# Patient Record
Sex: FEMALE | Race: ASIAN | Employment: FULL TIME | URBAN - METROPOLITAN AREA
[De-identification: names, ages, dates, MRNs, and addresses within clinical notes are randomized per-mention and may not be internally consistent; named-entity substitution may affect disease eponyms.]

---

## 2017-06-01 ENCOUNTER — OFFICE VISIT (OUTPATIENT)
Dept: INTERNAL MEDICINE CLINIC | Age: 37
End: 2017-06-01

## 2017-06-01 VITALS
DIASTOLIC BLOOD PRESSURE: 79 MMHG | HEART RATE: 67 BPM | TEMPERATURE: 98.1 F | WEIGHT: 213 LBS | HEIGHT: 66 IN | OXYGEN SATURATION: 98 % | SYSTOLIC BLOOD PRESSURE: 118 MMHG | RESPIRATION RATE: 10 BRPM | BODY MASS INDEX: 34.23 KG/M2

## 2017-06-01 DIAGNOSIS — E66.9 OBESITY, UNSPECIFIED OBESITY SEVERITY, UNSPECIFIED OBESITY TYPE: ICD-10-CM

## 2017-06-01 DIAGNOSIS — K64.9 HEMORRHOIDS, UNSPECIFIED HEMORRHOID TYPE: ICD-10-CM

## 2017-06-01 DIAGNOSIS — R53.83 FATIGUE, UNSPECIFIED TYPE: ICD-10-CM

## 2017-06-01 DIAGNOSIS — R63.5 WEIGHT GAIN: ICD-10-CM

## 2017-06-01 DIAGNOSIS — Z76.89 ENCOUNTER TO ESTABLISH CARE WITH NEW DOCTOR: Primary | ICD-10-CM

## 2017-06-01 RX ORDER — BISMUTH SUBSALICYLATE 262 MG
1 TABLET,CHEWABLE ORAL DAILY
COMMUNITY

## 2017-06-01 NOTE — PROGRESS NOTES
Bobby Rose is a 30 y.o. female who presents today for Establish Care (c/o fatigue x 2 months, weight loss, hemorroids)  . She has a history of There is no problem list on file for this patient. .    Today patient is here to establish care. Previous PCP Dr. Gwyn Schmid. she is switching because recently moved here, but rarely seen. Records are not available for me to review. I see her  in my clinic.     she does have other concerns. Hemorrhoids: For some time. This has been getting a bit worse recently. Worse with constipation. These are acting up a couple times per week. Increased hydration has helped. Weight gain/fatigue: notes that recently she has been having increased fatigue. Seems to be mid-day around 2-3. Diet stable. Sleeping 6-7 hours but this has been stable. Notes that weight has trended up. Pt working out regularly. Does both cardiac and weight resistance multiple times per week. Pre-pregnancy weight around 175#. Since pregnancy highest 230# and has been as low as 195#. Pt does have two children who are 5/9. Many vegetables for diet. Social:  at [employer]. Vegetarian but not vegan. GYN: Dr. Lainey Gómez; PAP UTD. No birth control. Not planning more children. Family:   Mother: pre-DM  Father: Hypothyroidism  MGP: diabetes    ROS  Review of Systems   Constitutional: Positive for malaise/fatigue. Negative for chills, fever and weight loss. Weight gain   HENT: Negative for ear discharge, ear pain, hearing loss and tinnitus. Eyes: Negative for blurred vision, double vision, photophobia and pain. Respiratory: Negative for cough, hemoptysis, sputum production and shortness of breath. Cardiovascular: Negative for chest pain, palpitations, orthopnea and claudication. Gastrointestinal: Positive for blood in stool (With hemorrhoids. ). Negative for abdominal pain, heartburn, nausea and vomiting.    Genitourinary: Negative for dysuria, frequency and urgency. Musculoskeletal: Negative for back pain, joint pain, myalgias and neck pain. Skin: Negative for itching and rash. Neurological: Negative. Negative for headaches. Psychiatric/Behavioral: Negative for depression, hallucinations, substance abuse and suicidal ideas. The patient is not nervous/anxious. Visit Vitals    /79 (BP 1 Location: Left arm, BP Patient Position: Sitting)    Pulse 67    Temp 98.1 °F (36.7 °C) (Oral)    Resp 10    Ht 5' 6\" (1.676 m)    Wt 213 lb (96.6 kg)    SpO2 98%    Breastfeeding No    BMI 34.38 kg/m2       Physical Exam   Constitutional: She is oriented to person, place, and time and well-developed, well-nourished, and in no distress. No distress. HENT:   Head: Normocephalic and atraumatic. Mouth/Throat: No oropharyngeal exudate. Eyes: Conjunctivae are normal. Pupils are equal, round, and reactive to light. No scleral icterus. Neck: Normal range of motion. No JVD present. No thyromegaly present. Cardiovascular: Normal rate and regular rhythm. Exam reveals no gallop and no friction rub. No murmur heard. Pulmonary/Chest: Effort normal and breath sounds normal. No respiratory distress. She has no wheezes. Abdominal: Soft. Bowel sounds are normal. She exhibits no distension. There is no rebound and no guarding. Musculoskeletal: Normal range of motion. She exhibits no edema. Neurological: She is alert and oriented to person, place, and time. No cranial nerve deficit. Skin: Skin is dry. No rash noted. Psychiatric: Mood, memory, affect and judgment normal.       Current Outpatient Prescriptions   Medication Sig    multivitamin (ONE A DAY) tablet Take 1 Tab by mouth daily.  hydrocortisone-pramoxine (PROCTOFOAM HC) rectal foam Insert 1 Applicator into rectum two (2) times a day. No current facility-administered medications for this visit. History reviewed. No pertinent past medical history.    History reviewed. No pertinent surgical history. Social History   Substance Use Topics    Smoking status: Never Smoker    Smokeless tobacco: Not on file    Alcohol use Yes      Comment: 4-5 glasses per year      Family History   Problem Relation Age of Onset    Hypertension Mother     Lung Disease Mother      TB in '01    Elevated Lipids Father     Stroke Maternal Grandmother     Arthritis-osteo Maternal Grandfather     Cancer Maternal Grandfather 80     colon ca    Diabetes Maternal Grandfather     Elevated Lipids Maternal Grandfather         No Known Allergies     Assessment/Plan  Pete Mendes was seen today for establish care. Diagnoses and all orders for this visit:    Encounter to establish care with new doctor - No significant old records. GYN care done elsewhere. Hemorrhoids, unspecified hemorrhoid type - Discussed prevention of constipation and wiping with wet wipes. Pt to use PRN proctofoam.   -     hydrocortisone-pramoxine (PROCTOFOAM HC) rectal foam; Insert 1 Applicator into rectum two (2) times a day. Weight gain - discussed goals of weight loss. Also discussed possibilities available pharmacologically, especially phentermine and Saxenda. 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 Pt doing a great job working out and dieting. Check thyroid, but likely genetically linked as mother is overweight.   -     CBC WITH AUTOMATED DIFF  -     METABOLIC PANEL, COMPREHENSIVE  -     LIPID PANEL    Fatigue, unspecified type  -     TSH 3RD GENERATION    Obesity, unspecified obesity severity, unspecified obesity type  -     CBC WITH AUTOMATED DIFF  -     METABOLIC PANEL, COMPREHENSIVE  -     LIPID PANEL    Over 50% of a visit of 45 minutes spent reviewing diagnosis and treatment options and side effects of medicines. Follow-up Disposition:  Return in about 4 weeks (around 6/29/2017).     Norm Carter MD  6/1/2017

## 2017-06-01 NOTE — MR AVS SNAPSHOT
Visit Information Date & Time Provider Department Dept. Phone Encounter #  
 6/1/2017 10:15 AM Charmaine Vasquez MD [facility] [address] [id number] Follow-up Instructions Return in about 4 weeks (around 6/29/2017). Upcoming Health Maintenance Date Due DTaP/Tdap/Td series (1 - Tdap) 1/16/2003 PAP AKA CERVICAL CYTOLOGY 1/16/2003 INFLUENZA AGE 9 TO ADULT 8/1/2017 Allergies as of 6/1/2017  Review Complete On: 6/1/2017 By: Charmaine Vasquez MD  
 No Known Allergies Current Immunizations  Never Reviewed No immunizations on file. Not reviewed this visit You Were Diagnosed With   
  
 Codes Comments Encounter to establish care with new doctor    -  Primary ICD-10-CM: Z71.89 ICD-9-CM: V65.8 Hemorrhoids, unspecified hemorrhoid type     ICD-10-CM: K64.9 ICD-9-CM: 455.6 Weight gain     ICD-10-CM: R63.5 ICD-9-CM: 783.1 Fatigue, unspecified type     ICD-10-CM: R53.83 ICD-9-CM: 780.79 Obesity, unspecified obesity severity, unspecified obesity type     ICD-10-CM: E66.9 ICD-9-CM: 278.00 Vitals BP Pulse Temp Resp Height(growth percentile) Weight(growth percentile) 118/79 (BP 1 Location: Left arm, BP Patient Position: Sitting) 67 98.1 °F (36.7 °C) (Oral) 10 5' 6\" (1.676 m) 213 lb (96.6 kg) LMP SpO2 Breastfeeding? BMI OB Status Smoking Status 05/22/2017 (Exact Date) 98% No 34.38 kg/m2 Having regular periods Never Smoker Vitals History BMI and BSA Data Body Mass Index Body Surface Area  
 34.38 kg/m 2 2.12 m 2 Preferred Pharmacy Pharmacy Name [facility], [address] [phone] Your Updated Medication List  
  
   
This list is accurate as of: 6/1/17 10:57 AM.  Always use your most recent med list.  
  
  
  
  
 hydrocortisone-pramoxine rectal foam  
Commonly known as:  PROCTOFOAM HC  
 Insert 1 Applicator into rectum two (2) times a day. multivitamin tablet Commonly known as:  ONE A DAY Take 1 Tab by mouth daily. Prescriptions Sent to Pharmacy Refills  
 hydrocortisone-pramoxine (PROCTOFOAM HC) rectal foam 0 Sig: Insert 1 Applicator into rectum two (2) times a day. Class: Normal  
 Pharmacy: [facility] [address] #: [phone] Route: Rectal  
  
We Performed the Following CBC WITH AUTOMATED DIFF [61850 CPT(R)] LIPID PANEL [88496 CPT(R)] METABOLIC PANEL, COMPREHENSIVE [37727 CPT(R)] TSH 3RD GENERATION [32792 CPT(R)] Follow-up Instructions Return in about 4 weeks (around 6/29/2017). Introducing [facility]! [facility] introduces [employer] patient portal. Now you can access parts of your medical record, email your doctor's office, and request medication refills online. 1. In your internet browser, go to [email or web address] 2. Click on the First Time User? Click Here link in the Sign In box. You will see the New Member Sign Up page. 3. Enter your [employer] Access Code exactly as it appears below. You will not need to use this code after youve completed the sign-up process. If you do not sign up before the expiration date, you must request a new code. · [employer] Access Code: [email or web address] Expires: 8/30/2017  9:36 AM 
 
4. Enter the last four digits of your Social Security Number (xxxx) and Date of Birth (mm/dd/yyyy) as indicated and click Submit. You will be taken to the next sign-up page. 5. Create a X[employer]kang ID. This will be your [employer] login ID and cannot be changed, so think of one that is secure and easy to remember. 6. Create a IPGt password. You can change your password at any time. 7. Enter your Password Reset Question and Answer. This can be used at a later time if you forget your password. 8. Enter your e-mail address. You will receive e-mail notification when new information is available in [address] 9. Click Sign Up. You can now view and download portions of your medical record. 10. Click the Download Summary menu link to download a portable copy of your medical information. If you have questions, please visit the Frequently Asked Questions section of the [employer] website. Remember, [employer] is NOT to be used for urgent needs. For medical emergencies, dial 911. Now available from your iPhone and Android! Please provide this summary of care documentation to your next provider. Your primary care clinician is listed as Elio Trent. If you have any questions after today's visit, please call [phone].

## 2017-06-07 DIAGNOSIS — E03.9 ACQUIRED HYPOTHYROIDISM: Primary | ICD-10-CM

## 2017-06-07 LAB
ALBUMIN SERPL-MCNC: 3.8 G/DL (ref 3.5–5.5)
ALBUMIN/GLOB SERPL: 1.4 {RATIO} (ref 1.2–2.2)
ALP SERPL-CCNC: 66 IU/L (ref 39–117)
ALT SERPL-CCNC: 19 IU/L (ref 0–32)
AST SERPL-CCNC: 19 IU/L (ref 0–40)
BASOPHILS # BLD AUTO: 0 X10E3/UL (ref 0–0.2)
BASOPHILS NFR BLD AUTO: 1 %
BILIRUB SERPL-MCNC: 0.5 MG/DL (ref 0–1.2)
BUN SERPL-MCNC: 11 MG/DL (ref 6–20)
BUN/CREAT SERPL: 14 (ref 9–23)
CALCIUM SERPL-MCNC: 9 MG/DL (ref 8.7–10.2)
CHLORIDE SERPL-SCNC: 100 MMOL/L (ref 96–106)
CHOLEST SERPL-MCNC: 187 MG/DL (ref 100–199)
CO2 SERPL-SCNC: 25 MMOL/L (ref 18–29)
CREAT SERPL-MCNC: 0.78 MG/DL (ref 0.57–1)
EOSINOPHIL # BLD AUTO: 0.1 X10E3/UL (ref 0–0.4)
EOSINOPHIL NFR BLD AUTO: 2 %
ERYTHROCYTE [DISTWIDTH] IN BLOOD BY AUTOMATED COUNT: 13.4 % (ref 12.3–15.4)
GLOBULIN SER CALC-MCNC: 2.8 G/DL (ref 1.5–4.5)
GLUCOSE SERPL-MCNC: 85 MG/DL (ref 65–99)
HCT VFR BLD AUTO: 38.8 % (ref 34–46.6)
HDLC SERPL-MCNC: 47 MG/DL
HGB BLD-MCNC: 12.8 G/DL (ref 11.1–15.9)
IMM GRANULOCYTES # BLD: 0 X10E3/UL (ref 0–0.1)
IMM GRANULOCYTES NFR BLD: 0 %
INTERPRETATION, 910389: NORMAL
LDLC SERPL CALC-MCNC: 120 MG/DL (ref 0–99)
LYMPHOCYTES # BLD AUTO: 2 X10E3/UL (ref 0.7–3.1)
LYMPHOCYTES NFR BLD AUTO: 39 %
MCH RBC QN AUTO: 27.2 PG (ref 26.6–33)
MCHC RBC AUTO-ENTMCNC: 33 G/DL (ref 31.5–35.7)
MCV RBC AUTO: 83 FL (ref 79–97)
MONOCYTES # BLD AUTO: 0.3 X10E3/UL (ref 0.1–0.9)
MONOCYTES NFR BLD AUTO: 6 %
NEUTROPHILS # BLD AUTO: 2.7 X10E3/UL (ref 1.4–7)
NEUTROPHILS NFR BLD AUTO: 52 %
PLATELET # BLD AUTO: 328 X10E3/UL (ref 150–379)
POTASSIUM SERPL-SCNC: 4.4 MMOL/L (ref 3.5–5.2)
PROT SERPL-MCNC: 6.6 G/DL (ref 6–8.5)
RBC # BLD AUTO: 4.7 X10E6/UL (ref 3.77–5.28)
SODIUM SERPL-SCNC: 140 MMOL/L (ref 134–144)
TRIGL SERPL-MCNC: 98 MG/DL (ref 0–149)
TSH SERPL DL<=0.005 MIU/L-ACNC: 5.96 UIU/ML (ref 0.45–4.5)
VLDLC SERPL CALC-MCNC: 20 MG/DL (ref 5–40)
WBC # BLD AUTO: 5.1 X10E3/UL (ref 3.4–10.8)

## 2017-06-07 RX ORDER — LEVOTHYROXINE SODIUM 50 UG/1
50 TABLET ORAL
Qty: 30 TAB | Refills: 4 | Status: SHIPPED | OUTPATIENT
Start: 2017-06-07 | End: 2017-11-09 | Stop reason: SDUPTHER

## 2017-06-08 ENCOUNTER — TELEPHONE (OUTPATIENT)
Dept: INTERNAL MEDICINE CLINIC | Age: 37
End: 2017-06-08

## 2017-06-08 NOTE — TELEPHONE ENCOUNTER
----- Message from Sisi William sent at 6/8/2017  3:52 PM EDT -----  Regarding: Dr. David Diaz Telephone  Patient would like a call back regarding her medications.  Contact is [phone]

## 2017-09-28 ENCOUNTER — OFFICE VISIT (OUTPATIENT)
Dept: INTERNAL MEDICINE CLINIC | Age: 37
End: 2017-09-28

## 2017-09-28 VITALS
HEIGHT: 66 IN | TEMPERATURE: 98.2 F | OXYGEN SATURATION: 98 % | BODY MASS INDEX: 34.52 KG/M2 | HEART RATE: 64 BPM | SYSTOLIC BLOOD PRESSURE: 122 MMHG | RESPIRATION RATE: 16 BRPM | DIASTOLIC BLOOD PRESSURE: 77 MMHG | WEIGHT: 214.8 LBS

## 2017-09-28 DIAGNOSIS — E66.9 OBESITY, UNSPECIFIED OBESITY SEVERITY, UNSPECIFIED OBESITY TYPE: ICD-10-CM

## 2017-09-28 DIAGNOSIS — E03.9 ACQUIRED HYPOTHYROIDISM: ICD-10-CM

## 2017-09-28 DIAGNOSIS — Z00.00 WELLNESS EXAMINATION: Primary | ICD-10-CM

## 2017-09-28 RX ORDER — PHENTERMINE HYDROCHLORIDE 37.5 MG/1
37.5 TABLET ORAL
Qty: 30 TAB | Refills: 1 | Status: SHIPPED | OUTPATIENT
Start: 2017-09-28 | End: 2018-01-08 | Stop reason: SDUPTHER

## 2017-09-28 NOTE — PATIENT INSTRUCTIONS
Liraglutide (By injection)   Liraglutide (rei-q-DELP-tide)  Treats type 2 diabetes. Also used to help with weight loss in certain patients. Brand Name(s): Saxenda, Victoza   There may be other brand names for this medicine. When This Medicine Should Not Be Used: This medicine is not right for everyone. Do not use it if you had an allergic reaction to liraglutide, you have a multiple endocrine neoplasia syndrome type 2 (MEN 2), or if you or anyone in your family had medullary thyroid cancer. Tell your doctor if you are pregnant or become pregnant while you are using this medicine. How to Use This Medicine:   Injectable  · Your doctor will prescribe your exact dose and tell you how often it should be given. This medicine is given as a shot under the skin of your stomach, thighs, or upper arms. · If you use insulin in addition to this medicine, do not mix them into the same syringe. You may give the shots in the same area (such as your stomach), but do not give the shots right next to each other. · You may be taught how to give your medicine at home. Make sure you understand all instructions before giving yourself an injection. Do not use more medicine or use it more often than your doctor tells you to. · You will be shown the body areas where this shot can be given. Use a different body area each time you give yourself a shot. Keep track of where you give each shot to make sure you rotate body areas. · Use a new needle and syringe each time you inject your medicine. · Never share medicine pens with others under any circumstances. Sharing needles or pens can result in transmission of infection. · Drink extra fluids so you will urinate more often and help prevent kidney problems. · This medicine should come with a Medication Guide. Ask your pharmacist for a copy if you do not have one. · Missed dose: If you miss a dose of this medicine, use it as soon as you remember.  Then take your next daily dose as usual on the following day. Never take extra medicine to make up for a missed dose. If you miss a dose for 3 days or more, call your doctor to talk about how to restart your treatment. · Store your new, unused medicine pen in the refrigerator, in the original carton, and protect it from light. Do not freeze this medicine, and do not use the medicine if it has been frozen. You may store the opened medicine pen in the refrigerator or at room temperature for 30 days. Throw away your used pen after 30 days, even if it still has medicine in it. Remove the needle from the pen before you store it. · Throw away used needles in a hard, closed container that the needles cannot poke through. Keep this container away from children and pets. Drugs and Foods to Avoid:      Ask your doctor or pharmacist before using any other medicine, including over-the-counter medicines, vitamins, and herbal products. Warnings While Using This Medicine:   · Tell your doctor if you are breastfeeding, or if you have kidney disease, liver disease, digestion problems (such as gastroparesis), gallbladder disease, or a history of pancreas problems, depression, or angioedema (swelling of the arms, face, hands, mouth, or throat). · Do not use Saxenda® if you are also using Victoza®. They contain the same medicine. · This medicine may cause the following problems:   ¨ An increased risk of thyroid tumor  ¨ Pancreatitis  ¨ Low blood sugar  ¨ Gallbladder problems, including gallstones (Saxenda®)  ¨ Thoughts of hurting yourself Morna Both)  · Your doctor will do lab tests at regular visits to check on the effects of this medicine. Keep all appointments. · Keep all medicine out of the reach of children. Never share your medicine with anyone.   Possible Side Effects While Using This Medicine:   Call your doctor right away if you notice any of these side effects:  · Allergic reaction: Itching or hives, swelling in your face or hands, swelling or tingling in your mouth or throat, chest tightness, trouble breathing  · Change in how much or how often you urinate, or painful or burning urination  · Fast or racing heartbeat  · Feeling sad or depressed, thoughts of suicide, unusual changes in mood or behavior  · Shaking, trembling, sweating, fast or pounding heartbeat, fainting, hunger, confusion  · Sudden and severe stomach pain, nausea, vomiting, fever, and lightheadedness  · Trouble breathing or swallowing, a lump in your neck, hoarseness when speaking  · Yellow skin or eyes  If you notice these less serious side effects, talk with your doctor:   · Decreased appetite  · Dizziness or headache  · Nausea, diarrhea, constipation, or upset stomach  · Redness, itching, swelling, or any changes in your skin where the shot was given  If you notice other side effects that you think are caused by this medicine, tell your doctor. Call your doctor for medical advice about side effects. You may report side effects to FDA at 1-599-FDA-4561  © 2017 [address] Information is for End User's use only and may not be sold, redistributed or otherwise used for commercial purposes. The above information is an  only. It is not intended as medical advice for individual conditions or treatments. Talk to your doctor, nurse or pharmacist before following any medical regimen to see if it is safe and effective for you. Well Visit, Ages 25 to 48: Care Instructions  Your Care Instructions  Physical exams can help you stay healthy. Your doctor has checked your overall health and may have suggested ways to take good care of yourself. He or she also may have recommended tests. At home, you can help prevent illness with healthy eating, regular exercise, and other steps. Follow-up care is a key part of your treatment and safety. Be sure to make and go to all appointments, and call your doctor if you are having problems.  It's also a good idea to know your test results and keep a list of the medicines you take. How can you care for yourself at home? · Reach and stay at a healthy weight. This will lower your risk for many problems, such as obesity, diabetes, heart disease, and high blood pressure. · Get at least 30 minutes of physical activity on most days of the week. Walking is a good choice. You also may want to do other activities, such as running, swimming, cycling, or playing tennis or team sports. Discuss any changes in your exercise program with your doctor. · Do not smoke or allow others to smoke around you. If you need help quitting, talk to your doctor about stop-smoking programs and medicines. These can increase your chances of quitting for good. · Talk to your doctor about whether you have any risk factors for sexually transmitted infections (STIs). Having one sex partner (who does not have STIs and does not have sex with anyone else) is a good way to avoid these infections. · Use birth control if you do not want to have children at this time. Talk with your doctor about the choices available and what might be best for you. · Protect your skin from too much sun. When you're outdoors from 10 a.m. to 4 p.m., stay in the shade or cover up with clothing and a hat with a wide brim. Wear sunglasses that block UV rays. Even when it's cloudy, put broad-spectrum sunscreen (SPF 30 or higher) on any exposed skin. · See a dentist one or two times a year for checkups and to have your teeth cleaned. · Wear a seat belt in the car. · Drink alcohol in moderation, if at all. That means no more than 2 drinks a day for men and 1 drink a day for women. Follow your doctor's advice about when to have certain tests. These tests can spot problems early. For everyone  · Cholesterol. Have the fat (cholesterol) in your blood tested after age 21.  Your doctor will tell you how often to have this done based on your age, family history, or other things that can increase your risk for heart disease. · Blood pressure. Have your blood pressure checked during a routine doctor visit. Your doctor will tell you how often to check your blood pressure based on your age, your blood pressure results, and other factors. · Vision. Talk with your doctor about how often to have a glaucoma test.  · Diabetes. Ask your doctor whether you should have tests for diabetes. · Colon cancer. Have a test for colon cancer at age 48. You may have one of several tests. If you are younger than 48, you may need a test earlier if you have any risk factors. Risk factors include whether you already had a precancerous polyp removed from your colon or whether your parent, brother, sister, or child has had colon cancer. For women  · Breast exam and mammogram. Talk to your doctor about when you should have a clinical breast exam and a mammogram. Medical experts differ on whether and how often women under 50 should have these tests. Your doctor can help you decide what is right for you. · Pap test and pelvic exam. Begin Pap tests at age 24. A Pap test is the best way to find cervical cancer. The test often is part of a pelvic exam. Ask how often to have this test.  · Tests for sexually transmitted infections (STIs). Ask whether you should have tests for STIs. You may be at risk if you have sex with more than one person, especially if your partners do not wear condoms. For men  · Tests for sexually transmitted infections (STIs). Ask whether you should have tests for STIs. You may be at risk if you have sex with more than one person, especially if you do not wear a condom. · Testicular cancer exam. Ask your doctor whether you should check your testicles regularly. · Prostate exam. Talk to your doctor about whether you should have a blood test (called a PSA test) for prostate cancer.  Experts differ on whether and when men should have this test. Some experts suggest it if you are older than 39 and are -American or have a father or brother who got prostate cancer when he was younger than 72. When should you call for help? Watch closely for changes in your health, and be sure to contact your doctor if you have any problems or symptoms that concern you. Where can you learn more? Go to http://ry-aida.info/. Enter P072 in the search box to learn more about \"Well Visit, Ages 25 to 48: Care Instructions. \"  Current as of: July 19, 2016  Content Version: 11.3  © 1607-6514 [employer]. Care instructions adapted under license by [employer] (which disclaims liability or warranty for this information). If you have questions about a medical condition or this instruction, always ask your healthcare professional. [address] any warranty or liability for your use of this information. Phentermine (Adipex-P) - (By mouth)   Why this medicine is used:   Helps you lose weight when used for a short time. Contact a nurse or doctor right away if you have:  · Fast, slow, pounding, or uneven heartbeat, chest pain, trouble breathing  · Swelling of your feet or lower legs  · Seizures, tremors, headache     Common side effects:  · Dizziness, drowsiness, restlessness or nervousness, trouble sleeping  · Dry mouth or a bad taste, nausea, vomiting, diarrhea, constipation, stomach cramps  · Changes in sex drive  © 9058 [address] Information is for End User's use only and may not be sold, redistributed or otherwise used for commercial purposes. Phentermine (By mouth)   Phentermine (FEN-ter-meen)  Helps you lose weight when used for a short time. Brand Name(s): Adipex-P, Lomaira   There may be other brand names for this medicine. When This Medicine Should Not Be Used: This medicine is not right for everyone. Do not use it if you had an allergic reaction to phentermine or similar medicines, or if you are pregnant.   How to Use This Medicine:   Dissolving Tablet, Capsule, Long Acting Capsule, Tablet, Dissolving Tablet  · Your doctor will tell you how much medicine to use. Do not use more than directed. · This medicine is not for long-term use. · To avoid trouble sleeping, always take this medicine in the morning and never at bedtime or late in the evening. ¨ Take the capsule 2 hours after breakfast.  ¨ Take the extended-release capsule before breakfast.  ¨ Take the disintegrating tablet in the morning, with or without food. ¨ Take the phentermine tablet before breakfast or 1 to 2 hours after breakfast.  ¨ Take Lomaira tablet 30 minutes before meals. · Swallow the extended-release capsule whole. Do not crush, break, or chew it. · If you are using the disintegrating tablet, make sure your hands are dry before you handle the tablet. Place the tablet on your tongue. It should melt quickly. After the tablet has melted, swallow or take a drink of water. · Tablet: Swallow whole. Do not crush, break, or chew it. · Carefully follow your doctor's instructions about any special diet. · Missed dose: Take a dose as soon as you remember. If it is almost time for your next dose, wait until then and take a regular dose. Do not take extra medicine to make up for a missed dose. · Store the medicine in a closed container at room temperature, away from heat, moisture, and direct light. Drugs and Foods to Avoid:   Ask your doctor or pharmacist before using any other medicine, including over-the-counter medicines, vitamins, and herbal products. · Do not use this medicine and an MAO inhibitor (MAOI) within 14 days of each other. · Some medicines can affect how phentermine works.  Tell your doctor if you are using any of the following:  ¨ Amphetamine medicine (including dextroamphetamine, methamphetamine)  ¨ Diet pills  ¨ Insulin or diabetes medicine  ¨ Medicine to treat depression (including fluoxetine, fluvoxamine, paroxetine, sertraline)  · Do not drink alcohol while you are using this medicine. Warnings While Using This Medicine:   · It is not safe to take this medicine during pregnancy. It could harm an unborn baby. Tell your doctor right away if you become pregnant. · Tell your doctor if you are breastfeeding, or if you have kidney disease, diabetes, glaucoma, congestive heart failure, heart or blood vessel disease, high blood pressure, overactive thyroid, or a history of stroke or drug abuse. Tell your doctor if have allergies to aspirin or tartrazine. · This medicine may cause the following problems:  ¨ Primary pulmonary hypertension (a serious lung problem)  ¨ Heart valve disease  ¨ Changes in blood sugar levels  · This medicine may make you dizzy or drowsy. Do not drive or do anything else that could be dangerous until you know how this medicine affects you. · This medicine can be habit-forming. Do not use more than your prescribed dose. Call your doctor if you think your medicine is not working. · Do not stop using this medicine suddenly. Your doctor will need to slowly decrease your dose before you stop it completely. · Your doctor will check your progress and the effects of this medicine at regular visits. Keep all appointments. · Keep all medicine out of the reach of children. Never share your medicine with anyone.   Possible Side Effects While Using This Medicine:   Call your doctor right away if you notice any of these side effects:  · Allergic reaction: Itching or hives, swelling in your face or hands, swelling or tingling in your mouth or throat, chest tightness, trouble breathing  · Chest pain, fainting, trouble breathing  · Fast, slow, pounding, or uneven heartbeat  · Seizures or tremors  · Severe headache  · Swelling of your feet or lower legs  If you notice these less serious side effects, talk with your doctor:   · Changes in sex drive  · Dizziness, drowsiness, mild headache  · Dry mouth or a bad taste in your mouth  · Nausea, vomiting, diarrhea, constipation, stomach cramps  · Restlessness or nervousness, trouble sleeping  If you notice other side effects that you think are caused by this medicine, tell your doctor. Call your doctor for medical advice about side effects. You may report side effects to FDA at 2-568-FDA-2768  © 2017 [address]  Information is for End User's use only and may not be sold, redistributed or otherwise used for commercial purposes. The above information is an  only. It is not intended as medical advice for individual conditions or treatments. Talk to your doctor, nurse or pharmacist before following any medical regimen to see if it is safe and effective for you.

## 2017-09-28 NOTE — PROGRESS NOTES
Abner Lam is a 30 y.o. female who presents today for Complete Physical  .      She has a history of   Patient Active Problem List   Diagnosis Code    Acquired hypothyroidism E03.9    Obesity E66.9   . Today patient is here for CPE. she does not have other concerns. Hypothyroidism: noted on screen for inability to loose weight. Started on synthroid 50mcg. Has not lost weight, but feeling as though she has more energy. Inability to loose weight. Weight unchanged since synthroid started. Pt has been exercising more. Doing better with this, but is concerned that this is not helping. Discussed phentermine and saxenda. Health maintenance hx includes:  Exercise: very active. Form of exercise: Cardio and weight   Diet: generally follows a low fat low cholesterol diet, nonsmoker, alcohol intake none  Social:  at [employer]. Vegetarian but not vegan. Screening:    Breast cancer screening: last mammogram, N/A   Cervical cancer screening: last PAP/Pelvic exam:  for OBGYN, last PAP was about a year ago    Immunizations: There is no immunization history for the selected administration types on file for this patient. Immunization status: up to date and documented. ROS  Review of Systems   Constitutional: Negative for chills, fever and weight loss. Respiratory: Negative for cough and shortness of breath. Cardiovascular: Negative for chest pain, palpitations and leg swelling. Gastrointestinal: Negative for abdominal pain, nausea and vomiting. Neurological: Negative. Endo/Heme/Allergies: Does not bruise/bleed easily. Psychiatric/Behavioral: Negative for depression. The patient is not nervous/anxious.         Visit Vitals    /77 (BP 1 Location: Left arm, BP Patient Position: Sitting)    Pulse 64    Temp 98.2 °F (36.8 °C) (Oral)    Resp 16    Ht 5' 6\" (1.676 m)    Wt 214 lb 12.8 oz (97.4 kg)    SpO2 98%    BMI 34.67 kg/m2       Physical Exam   Constitutional: She is oriented to person, place, and time. She appears well-developed and well-nourished. HENT:   Head: Normocephalic and atraumatic. Cardiovascular: Normal rate and regular rhythm. No murmur heard. Pulmonary/Chest: Effort normal. No respiratory distress. Neurological: She is alert and oriented to person, place, and time. Skin: Skin is warm and dry. Psychiatric: She has a normal mood and affect. Her behavior is normal.         Current Outpatient Prescriptions   Medication Sig    phentermine (ADIPEX-P) 37.5 mg tablet Take 1 Tab by mouth every morning. Max Daily Amount: 37.5 mg.    levothyroxine (SYNTHROID) 50 mcg tablet Take 1 Tab by mouth Daily (before breakfast).  multivitamin (ONE A DAY) tablet Take 1 Tab by mouth daily.  hydrocortisone-pramoxine (PROCTOFOAM HC) rectal foam Insert 1 Applicator into rectum two (2) times a day. No current facility-administered medications for this visit. History reviewed. No pertinent past medical history. History reviewed. No pertinent surgical history. Social History   Substance Use Topics    Smoking status: Never Smoker    Smokeless tobacco: Never Used    Alcohol use Yes      Comment: 4-5 glasses per year      Family History   Problem Relation Age of Onset    Hypertension Mother     Lung Disease Mother      TB in '01    Elevated Lipids Father     Stroke Maternal Grandmother     Arthritis-osteo Maternal Grandfather     Cancer Maternal Grandfather 80     colon ca    Diabetes Maternal Grandfather     Elevated Lipids Maternal Grandfather         No Known Allergies     Assessment/Plan  Diagnoses and all orders for this visit:    1. Wellness examination - GYN care elsewhere. Mirian Zhu was counseled on age-appropriate/ guideline-based risk prevention behaviors and screening for a 30y.o. year old   female .   We also discussed adjustments in screening based on family history if necessary. Printed instructions for preventative screening guidelines and healthy behaviors given to patient with after visit summary. 2. Acquired hypothyroidism  - goal <3   -     TSH 3RD GENERATION    3. Obesity, unspecified obesity severity, unspecified obesity type - Will wait and adjust synthroid. Pt to aske if saxenda is covered. If it is, will try this. Otherwise try a short course of phentermine. Low risk for complications with this. -     phentermine (ADIPEX-P) 37.5 mg tablet; Take 1 Tab by mouth every morning. Max Daily Amount: 37.5 mg.         Follow-up Disposition: Not on File    Casey Pantoja MD  9/28/2017

## 2017-09-28 NOTE — MR AVS SNAPSHOT
Visit Information Date & Time Provider Department Dept. Phone Encounter #  
 9/28/2017  9:30 AM Lorna Siegel MD [facility] [address] [id number] Upcoming Health Maintenance Date Due INFLUENZA AGE 9 TO ADULT 8/1/2017 PAP AKA CERVICAL CYTOLOGY 9/14/2019 DTaP/Tdap/Td series (2 - Td) 8/3/2021 Allergies as of 9/28/2017  Review Complete On: 8/66/1118 By: Sabrina Labrum Wears No Known Allergies Current Immunizations  Never Reviewed No immunizations on file. Not reviewed this visit You Were Diagnosed With   
  
 Codes Comments Wellness examination    -  Primary ICD-10-CM: Z00.00 ICD-9-CM: V70.0 Acquired hypothyroidism     ICD-10-CM: E03.9 ICD-9-CM: 244.9 Obesity, unspecified obesity severity, unspecified obesity type     ICD-10-CM: E66.9 ICD-9-CM: 278.00 Vitals BP Pulse Temp Resp Height(growth percentile) Weight(growth percentile) 122/77 (BP 1 Location: Left arm, BP Patient Position: Sitting) 64 98.2 °F (36.8 °C) (Oral) 16 5' 6\" (1.676 m) 214 lb 12.8 oz (97.4 kg) LMP SpO2 BMI OB Status Smoking Status 09/10/2017 98% 34.67 kg/m2 Having regular periods Never Smoker Vitals History BMI and BSA Data Body Mass Index Body Surface Area  
 34.67 kg/m 2 2.13 m 2 Preferred Pharmacy Pharmacy Name Phone [facility], [address] [phone] Your Updated Medication List  
  
   
This list is accurate as of: 9/28/17 10:19 AM.  Always use your most recent med list.  
  
  
  
  
 hydrocortisone-pramoxine rectal foam  
Commonly known as:  PROCTOFOAM HC Insert 1 Applicator into rectum two (2) times a day. levothyroxine 50 mcg tablet Commonly known as:  SYNTHROID Take 1 Tab by mouth Daily (before breakfast). multivitamin tablet Commonly known as:  ONE A DAY Take 1 Tab by mouth daily. phentermine 37.5 mg tablet Commonly known as:  ADIPEX-P Take 1 Tab by mouth every morning. Max Daily Amount: 37.5 mg.  
  
  
  
  
Prescriptions Printed Refills  
 phentermine (ADIPEX-P) 37.5 mg tablet 1 Sig: Take 1 Tab by mouth every morning. Max Daily Amount: 37.5 mg.  
 Class: Print Route: Oral  
  
We Performed the Following TSH 3RD GENERATION [25896 CPT(R)] Patient Instructions Liraglutide (By injection) Liraglutide (jxi-j-NNQC-tide) Treats type 2 diabetes. Also used to help with weight loss in certain patients. Brand Name(s): Tylene Dancer There may be other brand names for this medicine. When This Medicine Should Not Be Used: This medicine is not right for everyone. Do not use it if you had an allergic reaction to liraglutide, you have a multiple endocrine neoplasia syndrome type 2 (MEN 2), or if you or anyone in your family had medullary thyroid cancer. Tell your doctor if you are pregnant or become pregnant while you are using this medicine. How to Use This Medicine:  
Injectable · Your doctor will prescribe your exact dose and tell you how often it should be given. This medicine is given as a shot under the skin of your stomach, thighs, or upper arms. · If you use insulin in addition to this medicine, do not mix them into the same syringe. You may give the shots in the same area (such as your stomach), but do not give the shots right next to each other. · You may be taught how to give your medicine at home. Make sure you understand all instructions before giving yourself an injection. Do not use more medicine or use it more often than your doctor tells you to. · You will be shown the body areas where this shot can be given. Use a different body area each time you give yourself a shot. Keep track of where you give each shot to make sure you rotate body areas. · Use a new needle and syringe each time you inject your medicine. · Never share medicine pens with others under any circumstances. Sharing needles or pens can result in transmission of infection. · Drink extra fluids so you will urinate more often and help prevent kidney problems. · This medicine should come with a Medication Guide. Ask your pharmacist for a copy if you do not have one. · Missed dose: If you miss a dose of this medicine, use it as soon as you remember. Then take your next daily dose as usual on the following day. Never take extra medicine to make up for a missed dose. If you miss a dose for 3 days or more, call your doctor to talk about how to restart your treatment. · Store your new, unused medicine pen in the refrigerator, in the original carton, and protect it from light. Do not freeze this medicine, and do not use the medicine if it has been frozen. You may store the opened medicine pen in the refrigerator or at room temperature for 30 days. Throw away your used pen after 30 days, even if it still has medicine in it. Remove the needle from the pen before you store it. · Throw away used needles in a hard, closed container that the needles cannot poke through. Keep this container away from children and pets. Drugs and Foods to Avoid: Ask your doctor or pharmacist before using any other medicine, including over-the-counter medicines, vitamins, and herbal products. Warnings While Using This Medicine: · Tell your doctor if you are breastfeeding, or if you have kidney disease, liver disease, digestion problems (such as gastroparesis), gallbladder disease, or a history of pancreas problems, depression, or angioedema (swelling of the arms, face, hands, mouth, or throat). · Do not use Saxenda® if you are also using Victoza®. They contain the same medicine. · This medicine may cause the following problems: ¨ An increased risk of thyroid tumor ¨ Pancreatitis ¨ Low blood sugar ¨ Gallbladder problems, including gallstones (Saxenda®) ¨ Thoughts of hurting yourself Katherine Ibarra) · Your doctor will do lab tests at regular visits to check on the effects of this medicine. Keep all appointments. · Keep all medicine out of the reach of children. Never share your medicine with anyone. Possible Side Effects While Using This Medicine:  
Call your doctor right away if you notice any of these side effects: · Allergic reaction: Itching or hives, swelling in your face or hands, swelling or tingling in your mouth or throat, chest tightness, trouble breathing · Change in how much or how often you urinate, or painful or burning urination · Fast or racing heartbeat · Feeling sad or depressed, thoughts of suicide, unusual changes in mood or behavior · Shaking, trembling, sweating, fast or pounding heartbeat, fainting, hunger, confusion · Sudden and severe stomach pain, nausea, vomiting, fever, and lightheadedness · Trouble breathing or swallowing, a lump in your neck, hoarseness when speaking · Yellow skin or eyes If you notice these less serious side effects, talk with your doctor: · Decreased appetite · Dizziness or headache · Nausea, diarrhea, constipation, or upset stomach · Redness, itching, swelling, or any changes in your skin where the shot was given If you notice other side effects that you think are caused by this medicine, tell your doctor. Call your doctor for medical advice about side effects. You may report side effects to FDA at 8-400-FDA-2081 © 2017 [address] Information is for End User's use only and may not be sold, redistributed or otherwise used for commercial purposes. The above information is an  only. It is not intended as medical advice for individual conditions or treatments. Talk to your doctor, nurse or pharmacist before following any medical regimen to see if it is safe and effective for you. Well Visit, Ages 25 to 48: Care Instructions Your Care Instructions Physical exams can help you stay healthy. Your doctor has checked your overall health and may have suggested ways to take good care of yourself. He or she also may have recommended tests. At home, you can help prevent illness with healthy eating, regular exercise, and other steps. Follow-up care is a key part of your treatment and safety. Be sure to make and go to all appointments, and call your doctor if you are having problems. It's also a good idea to know your test results and keep a list of the medicines you take. How can you care for yourself at home? · Reach and stay at a healthy weight. This will lower your risk for many problems, such as obesity, diabetes, heart disease, and high blood pressure. · Get at least 30 minutes of physical activity on most days of the week. Walking is a good choice. You also may want to do other activities, such as running, swimming, cycling, or playing tennis or team sports. Discuss any changes in your exercise program with your doctor. · Do not smoke or allow others to smoke around you. If you need help quitting, talk to your doctor about stop-smoking programs and medicines. These can increase your chances of quitting for good. · Talk to your doctor about whether you have any risk factors for sexually transmitted infections (STIs). Having one sex partner (who does not have STIs and does not have sex with anyone else) is a good way to avoid these infections. · Use birth control if you do not want to have children at this time. Talk with your doctor about the choices available and what might be best for you. · Protect your skin from too much sun. When you're outdoors from 10 a.m. to 4 p.m., stay in the shade or cover up with clothing and a hat with a wide brim. Wear sunglasses that block UV rays. Even when it's cloudy, put broad-spectrum sunscreen (SPF 30 or higher) on any exposed skin. · See a dentist one or two times a year for checkups and to have your teeth cleaned. · Wear a seat belt in the car. · Drink alcohol in moderation, if at all. That means no more than 2 drinks a day for men and 1 drink a day for women. Follow your doctor's advice about when to have certain tests. These tests can spot problems early. For everyone · Cholesterol. Have the fat (cholesterol) in your blood tested after age 21. Your doctor will tell you how often to have this done based on your age, family history, or other things that can increase your risk for heart disease. · Blood pressure. Have your blood pressure checked during a routine doctor visit. Your doctor will tell you how often to check your blood pressure based on your age, your blood pressure results, and other factors. · Vision. Talk with your doctor about how often to have a glaucoma test. 
· Diabetes. Ask your doctor whether you should have tests for diabetes. · Colon cancer. Have a test for colon cancer at age 48. You may have one of several tests. If you are younger than 48, you may need a test earlier if you have any risk factors. Risk factors include whether you already had a precancerous polyp removed from your colon or whether your parent, brother, sister, or child has had colon cancer. For women · Breast exam and mammogram. Talk to your doctor about when you should have a clinical breast exam and a mammogram. Medical experts differ on whether and how often women under 50 should have these tests. Your doctor can help you decide what is right for you. · Pap test and pelvic exam. Begin Pap tests at age 24. A Pap test is the best way to find cervical cancer. The test often is part of a pelvic exam. Ask how often to have this test. 
· Tests for sexually transmitted infections (STIs). Ask whether you should have tests for STIs. You may be at risk if you have sex with more than one person, especially if your partners do not wear condoms. For men · Tests for sexually transmitted infections (STIs).  Ask whether you should have tests for STIs. You may be at risk if you have sex with more than one person, especially if you do not wear a condom. · Testicular cancer exam. Ask your doctor whether you should check your testicles regularly. · Prostate exam. Talk to your doctor about whether you should have a blood test (called a PSA test) for prostate cancer. Experts differ on whether and when men should have this test. Some experts suggest it if you are older than 39 and are -American or have a father or brother who got prostate cancer when he was younger than 72. When should you call for help? Watch closely for changes in your health, and be sure to contact your doctor if you have any problems or symptoms that concern you. Where can you learn more? Go to http://ry-aida.info/. Enter P072 in the search box to learn more about \"Well Visit, Ages 25 to 48: Care Instructions. \" Current as of: July 19, 2016 Content Version: 11.3 © 6408-9127 3 day [employer]. Care instructions adapted under license by [employer] (which disclaims liability or warranty for this information). If you have questions about a medical condition or this instruction, always ask your healthcare professional. [place] [address] any warranty or liability for your use of this information. Phentermine (Adipex-P) - (By mouth) Why this medicine is used:  
Helps you lose weight when used for a short time. Contact a nurse or doctor right away if you have: 
· Fast, slow, pounding, or uneven heartbeat, chest pain, trouble breathing · Swelling of your feet or lower legs · Seizures, tremors, headache Common side effects: 
· Dizziness, drowsiness, restlessness or nervousness, trouble sleeping · Dry mouth or a bad taste, nausea, vomiting, diarrhea, constipation, stomach cramps · Changes in sex drive © 2017 [facility] Information is for End User's use only and may not be sold, redistributed or otherwise used for commercial purposes. Phentermine (By mouth) Phentermine (FEN-ter-meen) Helps you lose weight when used for a short time. Brand Name(s): Hal Moreno There may be other brand names for this medicine. When This Medicine Should Not Be Used: This medicine is not right for everyone. Do not use it if you had an allergic reaction to phentermine or similar medicines, or if you are pregnant. How to Use This Medicine:  
Dissolving Tablet, Capsule, Long Acting Capsule, Tablet, Dissolving Tablet · Your doctor will tell you how much medicine to use. Do not use more than directed. · This medicine is not for long-term use. · To avoid trouble sleeping, always take this medicine in the morning and never at bedtime or late in the evening. ¨ Take the capsule 2 hours after breakfast. 
¨ Take the extended-release capsule before breakfast. 
¨ Take the disintegrating tablet in the morning, with or without food. ¨ Take the phentermine tablet before breakfast or 1 to 2 hours after breakfast. 
¨ Take Lomaira tablet 30 minutes before meals. · Swallow the extended-release capsule whole. Do not crush, break, or chew it. · If you are using the disintegrating tablet, make sure your hands are dry before you handle the tablet. Place the tablet on your tongue. It should melt quickly. After the tablet has melted, swallow or take a drink of water. · Tablet: Swallow whole. Do not crush, break, or chew it. · Carefully follow your doctor's instructions about any special diet. · Missed dose: Take a dose as soon as you remember. If it is almost time for your next dose, wait until then and take a regular dose. Do not take extra medicine to make up for a missed dose. · Store the medicine in a closed container at room temperature, away from heat, moisture, and direct light. Drugs and Foods to Avoid: Ask your doctor or pharmacist before using any other medicine, including over-the-counter medicines, vitamins, and herbal products. · Do not use this medicine and an MAO inhibitor (MAOI) within 14 days of each other. · Some medicines can affect how phentermine works. Tell your doctor if you are using any of the following: ¨ Amphetamine medicine (including dextroamphetamine, methamphetamine) ¨ Diet pills ¨ Insulin or diabetes medicine ¨ Medicine to treat depression (including fluoxetine, fluvoxamine, paroxetine, sertraline) · Do not drink alcohol while you are using this medicine. Warnings While Using This Medicine: · It is not safe to take this medicine during pregnancy. It could harm an unborn baby. Tell your doctor right away if you become pregnant. · Tell your doctor if you are breastfeeding, or if you have kidney disease, diabetes, glaucoma, congestive heart failure, heart or blood vessel disease, high blood pressure, overactive thyroid, or a history of stroke or drug abuse. Tell your doctor if have allergies to aspirin or tartrazine. · This medicine may cause the following problems: 
¨ Primary pulmonary hypertension (a serious lung problem) ¨ Heart valve disease ¨ Changes in blood sugar levels · This medicine may make you dizzy or drowsy. Do not drive or do anything else that could be dangerous until you know how this medicine affects you. · This medicine can be habit-forming. Do not use more than your prescribed dose. Call your doctor if you think your medicine is not working. · Do not stop using this medicine suddenly. Your doctor will need to slowly decrease your dose before you stop it completely. · Your doctor will check your progress and the effects of this medicine at regular visits. Keep all appointments. · Keep all medicine out of the reach of children. Never share your medicine with anyone. Possible Side Effects While Using This Medicine:  
Call your doctor right away if you notice any of these side effects: · Allergic reaction: Itching or hives, swelling in your face or hands, swelling or tingling in your mouth or throat, chest tightness, trouble breathing · Chest pain, fainting, trouble breathing · Fast, slow, pounding, or uneven heartbeat · Seizures or tremors · Severe headache · Swelling of your feet or lower legs If you notice these less serious side effects, talk with your doctor: · Changes in sex drive · Dizziness, drowsiness, mild headache · Dry mouth or a bad taste in your mouth · Nausea, vomiting, diarrhea, constipation, stomach cramps · Restlessness or nervousness, trouble sleeping If you notice other side effects that you think are caused by this medicine, tell your doctor. Call your doctor for medical advice about side effects. You may report side effects to FDA at 8-727-FDA-5531 © 2017 [address] Information is for End User's use only and may not be sold, redistributed or otherwise used for commercial purposes. The above information is an  only. It is not intended as medical advice for individual conditions or treatments. Talk to your doctor, nurse or pharmacist before following any medical regimen to see if it is safe and effective for you. Introducing [facility] & HEALTH SERVICES! Dear Bushra Jonas: Thank you for requesting a TheraSim account. Our records indicate that you already have an active TheraSim account. You can access your account anytime at [email or web address] Did you know that you can access your hospital and ER discharge instructions at any time in TheraSim? You can also review all of your test results from your hospital stay or ER visit. Additional Information If you have questions, please visit the Frequently Asked Questions section of the TheraSim website at [email or web address] Remember, TheraSim is NOT to be used for urgent needs. For medical emergencies, dial 911. Now available from your iPhone and Android! Please provide this summary of care documentation to your next provider. Your primary care clinician is listed as Latham Bracket. If you have any questions after today's visit, please call [phone].

## 2017-10-07 LAB — TSH SERPL DL<=0.005 MIU/L-ACNC: 2.51 UIU/ML (ref 0.45–4.5)

## 2017-11-09 DIAGNOSIS — E03.9 ACQUIRED HYPOTHYROIDISM: ICD-10-CM

## 2017-11-10 RX ORDER — LEVOTHYROXINE SODIUM 50 UG/1
TABLET ORAL
Qty: 30 TAB | Refills: 3 | Status: SHIPPED | OUTPATIENT
Start: 2017-11-10 | End: 2018-04-07 | Stop reason: SDUPTHER

## 2018-01-08 DIAGNOSIS — E66.9 OBESITY, UNSPECIFIED OBESITY SEVERITY, UNSPECIFIED OBESITY TYPE: ICD-10-CM

## 2018-01-08 RX ORDER — PHENTERMINE HYDROCHLORIDE 37.5 MG/1
37.5 TABLET ORAL
Qty: 30 TAB | Refills: 0 | OUTPATIENT
Start: 2018-01-08 | End: 2018-05-11 | Stop reason: SDUPTHER

## 2018-01-08 NOTE — TELEPHONE ENCOUNTER
Patient needs a refill on her phentermine (ADIPEX-P) 37.5 mg tablet  [facility] [phone] her no [phone]

## 2018-04-07 DIAGNOSIS — E03.9 ACQUIRED HYPOTHYROIDISM: ICD-10-CM

## 2018-04-08 RX ORDER — LEVOTHYROXINE SODIUM 50 UG/1
TABLET ORAL
Qty: 30 TAB | Refills: 2 | Status: SHIPPED | OUTPATIENT
Start: 2018-04-08 | End: 2018-04-30 | Stop reason: SDUPTHER

## 2018-04-30 DIAGNOSIS — E03.9 ACQUIRED HYPOTHYROIDISM: ICD-10-CM

## 2018-04-30 RX ORDER — LEVOTHYROXINE SODIUM 50 UG/1
TABLET ORAL
Qty: 90 TAB | Refills: 0 | Status: SHIPPED | OUTPATIENT
Start: 2018-04-30 | End: 2018-05-12 | Stop reason: SDUPTHER

## 2018-05-11 ENCOUNTER — OFFICE VISIT (OUTPATIENT)
Dept: INTERNAL MEDICINE CLINIC | Age: 38
End: 2018-05-11

## 2018-05-11 VITALS
TEMPERATURE: 98.5 F | SYSTOLIC BLOOD PRESSURE: 110 MMHG | OXYGEN SATURATION: 98 % | HEIGHT: 66 IN | BODY MASS INDEX: 32.95 KG/M2 | WEIGHT: 205 LBS | HEART RATE: 86 BPM | RESPIRATION RATE: 16 BRPM | DIASTOLIC BLOOD PRESSURE: 76 MMHG

## 2018-05-11 DIAGNOSIS — E03.9 ACQUIRED HYPOTHYROIDISM: Primary | ICD-10-CM

## 2018-05-11 DIAGNOSIS — D50.9 IRON DEFICIENCY ANEMIA, UNSPECIFIED IRON DEFICIENCY ANEMIA TYPE: ICD-10-CM

## 2018-05-11 DIAGNOSIS — E66.9 CLASS 1 OBESITY IN ADULT, UNSPECIFIED BMI, UNSPECIFIED OBESITY TYPE, UNSPECIFIED WHETHER SERIOUS COMORBIDITY PRESENT: ICD-10-CM

## 2018-05-11 RX ORDER — PHENTERMINE HYDROCHLORIDE 37.5 MG/1
37.5 TABLET ORAL
Qty: 30 TAB | Refills: 1 | Status: SHIPPED | OUTPATIENT
Start: 2018-05-16 | End: 2019-04-10 | Stop reason: SDUPTHER

## 2018-05-11 NOTE — MR AVS SNAPSHOT
[address] [facility] 
 
 
 [address] [facility] [address] [facility] 
[phone] Patient: Wili Angel MRN: [id number] BXB:[id number] Visit Information Date & Time Provider Department Dept. Phone Encounter #  
 5/11/2018  9:15 AM Daniel Eid MD [facility] [address] [facility] [id number] Follow-up Instructions Return in about 4 months (around 9/19/2018) for Physical . Upcoming Health Maintenance Date Due Influenza Age 5 to Adult 8/1/2018 PAP AKA CERVICAL CYTOLOGY 9/14/2019 DTaP/Tdap/Td series (2 - Td) 8/3/2021 Allergies as of 5/11/2018  Review Complete On: 7/95/8842 By: Shaan [place] Wears No Known Allergies Current Immunizations  Never Reviewed No immunizations on file. Not reviewed this visit You Were Diagnosed With   
  
 Codes Comments Acquired hypothyroidism    -  Primary ICD-10-CM: E03.9 ICD-9-CM: 244.9 Class 1 obesity in adult, unspecified BMI, unspecified obesity type, unspecified whether serious comorbidity present     ICD-10-CM: E66.9 ICD-9-CM: 278.00 Iron deficiency anemia, unspecified iron deficiency anemia type     ICD-10-CM: D50.9 ICD-9-CM: 280.9 Vitals BP Pulse Temp Resp Height(growth percentile) Weight(growth percentile) 110/76 (BP 1 Location: Left arm, BP Patient Position: Sitting) 86 98.5 °F (36.9 °C) (Oral) 16 5' 6\" (1.676 m) 205 lb (93 kg) LMP SpO2 BMI OB Status Smoking Status 05/02/2018 98% 33.09 kg/m2 Having regular periods Never Smoker Vitals History BMI and BSA Data Body Mass Index Body Surface Area 33.09 kg/m 2 2.08 m 2 Preferred Pharmacy Pharmacy Name Phone [facility] [address], [address] [phone] Your Updated Medication List  
  
   
This list is accurate as of 5/11/18  9:44 AM.  Always use your most recent med list.  
  
  
  
  
 hydrocortisone-pramoxine rectal foam  
Commonly known as:  PROCTOFOAM HC Insert 1 Applicator into rectum two (2) times a day. levothyroxine 50 mcg tablet Commonly known as:  SYNTHROID  
TAKE ONE TABLET BY MOUTH DAILY BEFORE BREAKFAST  
  
 multivitamin tablet Commonly known as:  ONE A DAY Take 1 Tab by mouth daily. phentermine 37.5 mg tablet Commonly known as:  ADIPEX-P Take 1 Tab by mouth every morning. Max Daily Amount: 37.5 mg.  
Start taking on:  5/16/2018 Prescriptions Printed Refills  
 phentermine (ADIPEX-P) 37.5 mg tablet 1 Starting on: 5/16/2018 Sig: Take 1 Tab by mouth every morning. Max Daily Amount: 37.5 mg.  
 Class: Print Route: Oral  
  
We Performed the Following CBC WITH AUTOMATED DIFF [43634 CPT(R)] FERRITIN [62551 CPT(R)] IRON PROFILE [id number] CPT(R)] TSH 3RD GENERATION [55222 CPT(R)] VITAMIN D, 25 HYDROXY [id number] CPT(R)] Follow-up Instructions Return in about 4 months (around 9/19/2018) for Physical . Patient Instructions [email or web address] org Introducing [facility] [place] & HEALTH SERVICES! Dear Olivia Friday: Thank you for requesting a shoutr account. Our records indicate that you already have an active shoutr account. You can access your account anytime at [email or web address] Did you know that you can access your hospital and ER discharge instructions at any time in shoutr? You can also review all of your test results from your hospital stay or ER visit. Additional Information If you have questions, please visit the Frequently Asked Questions section of the shoutr website at [email or web address] Remember, shoutr is NOT to be used for urgent needs. For medical emergencies, dial 911. Now available from your iPhone and Android! Please provide this summary of care documentation to your next provider. Your primary care clinician is listed as Gladys Miranda. If you have any questions after today's visit, please call [phone].

## 2018-05-11 NOTE — PROGRESS NOTES
Mirian Zhu is a 41 y.o. female who presents today for Hypothyroidism  . She has a history of   Patient Active Problem List   Diagnosis Code    Acquired hypothyroidism E03.9    Obesity E66.9   . Today patient is here for f/u.   she does not have other concerns. Hypothyroidism: subclinical noted on blood work. Started on synthroid. Notes that her energy has gotten better overall. Obesity: noted having issues with weight loss. After diet and exercise life modifications were made, we decided to try some phentermine to help with this. Has taken two weeks of phentermine. Has been able to loose 9#. Feeling well. No significant s/e. Still exercising every other day. ROS  Review of Systems   Constitutional: Positive for weight loss. Negative for chills and fever. HENT: Negative for ear discharge, ear pain, hearing loss and tinnitus. Eyes: Negative for blurred vision, double vision, photophobia, pain and discharge. Respiratory: Negative for cough and shortness of breath. Cardiovascular: Negative for chest pain, palpitations, orthopnea, claudication and leg swelling. Gastrointestinal: Negative for abdominal pain, constipation, diarrhea, heartburn, nausea and vomiting. Skin: Negative for itching and rash. Neurological: Negative. Endo/Heme/Allergies: Does not bruise/bleed easily. Psychiatric/Behavioral: Negative for depression. The patient is not nervous/anxious. Visit Vitals    /76 (BP 1 Location: Left arm, BP Patient Position: Sitting)    Pulse 86    Temp 98.5 °F (36.9 °C) (Oral)    Resp 16    Ht 5' 6\" (1.676 m)    Wt 205 lb (93 kg)    SpO2 98%    BMI 33.09 kg/m2       Physical Exam   Constitutional: She is oriented to person, place, and time. She appears well-developed and well-nourished. HENT:   Head: Normocephalic and atraumatic. Eyes: EOM are normal. Pupils are equal, round, and reactive to light. Neck: Normal range of motion. Neck supple. Cardiovascular: Normal rate and regular rhythm. No murmur heard. Pulmonary/Chest: Effort normal. No respiratory distress. Abdominal: Soft. Bowel sounds are normal.   Neurological: She is alert and oriented to person, place, and time. Skin: Skin is warm and dry. Psychiatric: She has a normal mood and affect. Her behavior is normal.         Current Outpatient Prescriptions   Medication Sig    [START ON 5/16/2018] phentermine (ADIPEX-P) 37.5 mg tablet Take 1 Tab by mouth every morning. Max Daily Amount: 37.5 mg.    levothyroxine (SYNTHROID) 50 mcg tablet TAKE ONE TABLET BY MOUTH DAILY BEFORE BREAKFAST    multivitamin (ONE A DAY) tablet Take 1 Tab by mouth daily.  hydrocortisone-pramoxine (PROCTOFOAM HC) rectal foam Insert 1 Applicator into rectum two (2) times a day. No current facility-administered medications for this visit. History reviewed. No pertinent past medical history. History reviewed. No pertinent surgical history. Social History   Substance Use Topics    Smoking status: Never Smoker    Smokeless tobacco: Never Used    Alcohol use Yes      Comment: 4-5 glasses per year      Family History   Problem Relation Age of Onset    Hypertension Mother     Lung Disease Mother      TB in '01    Elevated Lipids Father     Stroke Maternal Grandmother     Arthritis-osteo Maternal Grandfather     Cancer Maternal Grandfather 80     colon ca    Diabetes Maternal Grandfather     Elevated Lipids Maternal Grandfather         No Known Allergies     Assessment/Plan  Diagnoses and all orders for this visit:    1. Acquired hypothyroidism - repeat levels. Check Vitamin D>   -     TSH 3RD GENERATION  -     VITAMIN D, 25 HYDROXY    2. Class 1 obesity in adult, unspecified BMI, unspecified obesity type, unspecified whether serious comorbidity present - discussed continued dieting and a plant based diet. Pt is vegetarian. Provided with websites to visit.  Pt to complete 12 weeks of phentermine and continue diet and exercise. Discussed importance of maintaining weight loss. Over 50% of a visit of 25 minutes spent reviewing diagnosis and treatment options and side effects of medicines. -     phentermine (ADIPEX-P) 37.5 mg tablet; Take 1 Tab by mouth every morning. Max Daily Amount: 37.5 mg.  -     VITAMIN D, 25 HYDROXY    3.  Iron deficiency anemia, unspecified iron deficiency anemia type - in the past. repeat  -     CBC WITH AUTOMATED DIFF  -     IRON PROFILE  -     FERRITIN  -     VITAMIN D, 25 HYDROXY        Follow-up Disposition:  Return in about 4 months (around 9/19/2018) for Physical .    Ruth Burgos MD  5/11/2018

## 2018-05-12 DIAGNOSIS — E03.9 ACQUIRED HYPOTHYROIDISM: ICD-10-CM

## 2018-05-12 LAB
25(OH)D3+25(OH)D2 SERPL-MCNC: 21.9 NG/ML (ref 30–100)
BASOPHILS # BLD AUTO: 0 X10E3/UL (ref 0–0.2)
BASOPHILS NFR BLD AUTO: 1 %
EOSINOPHIL # BLD AUTO: 0.1 X10E3/UL (ref 0–0.4)
EOSINOPHIL NFR BLD AUTO: 1 %
ERYTHROCYTE [DISTWIDTH] IN BLOOD BY AUTOMATED COUNT: 13 % (ref 12.3–15.4)
FERRITIN SERPL-MCNC: 45 NG/ML (ref 15–150)
HCT VFR BLD AUTO: 39 % (ref 34–46.6)
HGB BLD-MCNC: 12.7 G/DL (ref 11.1–15.9)
IMM GRANULOCYTES # BLD: 0 X10E3/UL (ref 0–0.1)
IMM GRANULOCYTES NFR BLD: 0 %
IRON SATN MFR SERPL: 16 % (ref 15–55)
IRON SERPL-MCNC: 60 UG/DL (ref 27–159)
LYMPHOCYTES # BLD AUTO: 2.2 X10E3/UL (ref 0.7–3.1)
LYMPHOCYTES NFR BLD AUTO: 45 %
MCH RBC QN AUTO: 27.7 PG (ref 26.6–33)
MCHC RBC AUTO-ENTMCNC: 32.6 G/DL (ref 31.5–35.7)
MCV RBC AUTO: 85 FL (ref 79–97)
MONOCYTES # BLD AUTO: 0.2 X10E3/UL (ref 0.1–0.9)
MONOCYTES NFR BLD AUTO: 4 %
NEUTROPHILS # BLD AUTO: 2.4 X10E3/UL (ref 1.4–7)
NEUTROPHILS NFR BLD AUTO: 49 %
PLATELET # BLD AUTO: 301 X10E3/UL (ref 150–379)
RBC # BLD AUTO: 4.59 X10E6/UL (ref 3.77–5.28)
TIBC SERPL-MCNC: 375 UG/DL (ref 250–450)
TSH SERPL DL<=0.005 MIU/L-ACNC: 2.96 UIU/ML (ref 0.45–4.5)
UIBC SERPL-MCNC: 315 UG/DL (ref 131–425)
WBC # BLD AUTO: 4.8 X10E3/UL (ref 3.4–10.8)

## 2018-05-12 RX ORDER — LEVOTHYROXINE SODIUM 75 UG/1
TABLET ORAL
Qty: 90 TAB | Refills: 1 | Status: SHIPPED | OUTPATIENT
Start: 2018-05-12 | End: 2018-12-13 | Stop reason: SDUPTHER

## 2018-07-06 ENCOUNTER — TELEPHONE (OUTPATIENT)
Dept: INTERNAL MEDICINE CLINIC | Age: 38
End: 2018-07-06

## 2018-07-06 NOTE — TELEPHONE ENCOUNTER
----- Message from Angie Mahad sent at 7/6/2018 10:20 AM EDT -----  Regarding: Dr. Dineen Oppenheim,, from [facility], was inquiring on if the practice had a TFH on the pt.     Fax number [phone]  Best contact number [phone]

## 2019-04-10 ENCOUNTER — OFFICE VISIT (OUTPATIENT)
Dept: INTERNAL MEDICINE CLINIC | Age: 39
End: 2019-04-10

## 2019-04-10 VITALS
WEIGHT: 203 LBS | DIASTOLIC BLOOD PRESSURE: 78 MMHG | SYSTOLIC BLOOD PRESSURE: 110 MMHG | OXYGEN SATURATION: 98 % | RESPIRATION RATE: 16 BRPM | BODY MASS INDEX: 32.62 KG/M2 | HEIGHT: 66 IN | TEMPERATURE: 98.4 F | HEART RATE: 74 BPM

## 2019-04-10 DIAGNOSIS — E03.9 ACQUIRED HYPOTHYROIDISM: Primary | ICD-10-CM

## 2019-04-10 DIAGNOSIS — E66.9 CLASS 1 OBESITY IN ADULT, UNSPECIFIED BMI, UNSPECIFIED OBESITY TYPE, UNSPECIFIED WHETHER SERIOUS COMORBIDITY PRESENT: ICD-10-CM

## 2019-04-10 DIAGNOSIS — Z13.220 SCREENING FOR CHOLESTEROL LEVEL: ICD-10-CM

## 2019-04-10 DIAGNOSIS — M76.31 ILIOTIBIAL BAND SYNDROME OF RIGHT SIDE: ICD-10-CM

## 2019-04-10 RX ORDER — LEVOTHYROXINE SODIUM 75 UG/1
75 TABLET ORAL
Qty: 90 TAB | Refills: 1 | Status: SHIPPED | OUTPATIENT
Start: 2019-04-10 | End: 2019-11-21 | Stop reason: SDUPTHER

## 2019-04-10 RX ORDER — PHENTERMINE HYDROCHLORIDE 37.5 MG/1
37.5 TABLET ORAL
Qty: 30 TAB | Refills: 3 | Status: SHIPPED | OUTPATIENT
Start: 2019-04-10 | End: 2020-05-13 | Stop reason: SDUPTHER

## 2019-04-10 NOTE — PROGRESS NOTES
Amandeep Cruz is a 42 y.o. female who presents today for Medication Evaluation; Weight Management; and Hypothyroidism [place] She has a history of  
Patient Active Problem List  
Diagnosis Code  Acquired hypothyroidism E03.9  Obesity E66.9 [place] Today patient is here for follow-up. [place] Hypothyroidism: On replacement. She is due for repeat. Obesity: Patient had a short course of phentermine in the past.  Her weight overall has been stable and is down from initial weight. Would like to do another course of Phentermine. Patient is having pain to the lateral right leg. This is worse in the bent position. Also when the leg is lateral.  Denies any hip injury. Patient has been working out more and exercising for the 10 K. She does a lot of walking. Denies any aggravating factors. She did not take anything for this. ROS Review of Systems Constitutional: Negative for chills, fever, malaise/fatigue and weight loss. HENT: Negative for congestion, ear discharge, ear pain, hearing loss, nosebleeds and tinnitus. Eyes: Negative for blurred vision, double vision and photophobia. Respiratory: Negative for cough, hemoptysis, sputum production and shortness of breath. Cardiovascular: Negative for chest pain, palpitations, orthopnea, claudication and leg swelling. Gastrointestinal: Negative for abdominal pain, nausea and vomiting. Genitourinary: Negative for dysuria, frequency, hematuria and urgency. Musculoskeletal: Positive for joint pain. Negative for back pain, myalgias and neck pain. Lateral R leg pain Skin: Negative for rash. Neurological: Negative. Endo/Heme/Allergies: Does not bruise/bleed easily. Psychiatric/Behavioral: Negative for depression. The patient is not nervous/anxious. Visit Vitals /78 (BP 1 Location: Left arm, BP Patient Position: Sitting) Pulse 74 Temp 98.4 °F (36.9 °C) (Oral) Resp 16 Ht 5' 6\" (1.676 m) Wt 203 lb (92.1 kg) SpO2 98% BMI 32.77 kg/m² Physical Exam  
Constitutional: She is oriented to person, place, and time. She appears well-developed and well-nourished. HENT:  
Head: Normocephalic and atraumatic. Right Ear: External ear normal.  
Left Ear: External ear normal.  
Eyes: Pupils are equal, round, and reactive to light. EOM are normal.  
Neck: Normal range of motion. Neck supple. Cardiovascular: Normal rate and regular rhythm. No murmur heard. Pulmonary/Chest: Effort normal. No stridor. No respiratory distress. She has no wheezes. Abdominal: Soft. Bowel sounds are normal. She exhibits no distension and no mass. There is no tenderness. There is no guarding. Musculoskeletal:  
     Legs: FROM of R hip. Pain where noted. Neurological: She is alert and oriented to person, place, and time. Skin: Skin is warm and dry. Psychiatric: She has a normal mood and affect. Her behavior is normal.  
 
 
 
Current Outpatient Medications Medication Sig  levothyroxine (SYNTHROID) 75 mcg tablet TAKE ONE TABLET BY MOUTH DAILY BEFORE BREAKFAST  multivitamin (ONE A DAY) tablet Take 1 Tab by mouth daily.  hydrocortisone-pramoxine (PROCTOFOAM HC) rectal foam Insert 1 Applicator into rectum two (2) times a day.  phentermine (ADIPEX-P) 37.5 mg tablet Take 1 Tab by mouth every morning. Max Daily Amount: 37.5 mg. No current facility-administered medications for this visit. History reviewed. No pertinent past medical history. History reviewed. No pertinent surgical history. Social History Tobacco Use  Smoking status: Never Smoker  Smokeless tobacco: Never Used Substance Use Topics  Alcohol use: Yes Comment: 4-5 glasses per year Family History Problem Relation Age of Onset  Hypertension Mother  Lung Disease Mother TB in '01  Elevated Lipids Father  Stroke Maternal Grandmother  Arthritis-osteo Maternal Grandfather  Cancer Maternal Grandfather 80  
     colon ca  Diabetes Maternal Grandfather  Elevated Lipids Maternal Grandfather No Known Allergies Assessment/Plan Diagnoses and all orders for this visit: 
 
1. Acquired hypothyroidism -repeat levels today. -     levothyroxine (SYNTHROID) 75 mcg tablet; Take 1 Tab by mouth Daily (before breakfast). -     TSH 3RD GENERATION 
-     BLOOD TYPE, (ABO+RH) 2. Class 1 obesity in adult, unspecified BMI, unspecified obesity type, unspecified whether serious comorbidity present -like to do another course of phentermine. No contraindications at this time. -     phentermine (ADIPEX-P) 37.5 mg tablet; Take 1 Tab by mouth every morning. Max Daily Amount: 37.5 mg. 
 
3. Screening for cholesterol level -this will be due for cholesterol coming up -     LIPID PANEL 
-     METABOLIC PANEL, COMPREHENSIVE 4. Iliotibial band syndrome of right side -pain consistent with iliotibial band syndrome and. Patient resistant to taking any anti-inflammatories. Will provide with stretches and see how she does between now and her physical next month Micheal Gregorio MD 
4/10/2019 This note was created with the help of speech recognition software (Dragon) and may contain some 'sound alike' errors.

## 2019-04-10 NOTE — PATIENT INSTRUCTIONS
Iliotibial Band Syndrome Stretches Piriformis Syndrome: Exercises Your Care Instructions Here are some examples of typical rehabilitation exercises for your condition. Start each exercise slowly. Ease off the exercise if you start to have pain. Your doctor or physical therapist will tell you when you can start these exercises and which ones will work best for you. How to do the exercises Hip rotator stretch 1. Lie on your back with both knees bent and your feet flat on the floor. 2. Put the ankle of your affected leg on your opposite thigh near your knee. 3. Use your hand to gently push your knee (on your affected leg) away from your body until you feel a gentle stretch around your hip. 4. Hold the stretch for 15 to 30 seconds. 5. Repeat 2 to 4 times. 6. Switch legs and repeat steps 1 through 5. Piriformis stretch 1. Lie on your back with your legs straight. 2. Lift your affected leg and bend your knee. With your opposite hand, reach across your body, and then gently pull your knee toward your opposite shoulder. 3. Hold the stretch for 15 to 30 seconds. 4. Repeat with your other leg. 5. Repeat 2 to 4 times on each side. Lower abdominal strengthening 1. Lie on your back with your knees bent and your feet flat on the floor. 2. Tighten your belly muscles by pulling your belly button in toward your spine. 3. Lift one foot off the floor and bring your knee toward your chest, so that your knee is straight above your hip and your leg is bent like the letter \"L. \" 
4. Lift the other knee up to the same position. 5. Lower one leg at a time to the starting position. 6. Keep alternating legs until you have lifted each leg 8 to 12 times. 7. Be sure to keep your belly muscles tight and your back still as you are moving your legs. Be sure to breathe normally. Follow-up care is a key part of your treatment and safety.  Be sure to make and go to all appointments, and call your doctor if you are having problems. It's also a good idea to know your test results and keep a list of the medicines you take. Current as of: September 20, 2018 Content Version: 11.9 © 9451-1711 [employer]. Care instructions adapted under license by [employer] (which disclaims liability or warranty for this information). If you have questions about a medical condition or this instruction, always ask your healthcare professional. [address] any warranty or liability for your use of this information.

## 2019-04-11 LAB
ABO GROUP BLD: NORMAL
ALBUMIN SERPL-MCNC: 3.9 G/DL (ref 3.5–5.5)
ALBUMIN/GLOB SERPL: 1.3 {RATIO} (ref 1.2–2.2)
ALP SERPL-CCNC: 66 IU/L (ref 39–117)
ALT SERPL-CCNC: 16 IU/L (ref 0–32)
AST SERPL-CCNC: 16 IU/L (ref 0–40)
BILIRUB SERPL-MCNC: 0.6 MG/DL (ref 0–1.2)
BUN SERPL-MCNC: 10 MG/DL (ref 6–20)
BUN/CREAT SERPL: 11 (ref 9–23)
CALCIUM SERPL-MCNC: 9 MG/DL (ref 8.7–10.2)
CHLORIDE SERPL-SCNC: 100 MMOL/L (ref 96–106)
CHOLEST SERPL-MCNC: 180 MG/DL (ref 100–199)
CO2 SERPL-SCNC: 23 MMOL/L (ref 20–29)
CREAT SERPL-MCNC: 0.95 MG/DL (ref 0.57–1)
GLOBULIN SER CALC-MCNC: 2.9 G/DL (ref 1.5–4.5)
GLUCOSE SERPL-MCNC: 85 MG/DL (ref 65–99)
HDLC SERPL-MCNC: 49 MG/DL
INTERPRETATION, 910389: NORMAL
LDLC SERPL CALC-MCNC: 116 MG/DL (ref 0–99)
POTASSIUM SERPL-SCNC: 4.7 MMOL/L (ref 3.5–5.2)
PROT SERPL-MCNC: 6.8 G/DL (ref 6–8.5)
RH BLD: POSITIVE
SODIUM SERPL-SCNC: 138 MMOL/L (ref 134–144)
TRIGL SERPL-MCNC: 76 MG/DL (ref 0–149)
TSH SERPL DL<=0.005 MIU/L-ACNC: 3.67 UIU/ML (ref 0.45–4.5)
VLDLC SERPL CALC-MCNC: 15 MG/DL (ref 5–40)

## 2019-05-24 ENCOUNTER — OFFICE VISIT (OUTPATIENT)
Dept: INTERNAL MEDICINE CLINIC | Age: 39
End: 2019-05-24

## 2019-05-24 VITALS
WEIGHT: 200 LBS | HEART RATE: 80 BPM | RESPIRATION RATE: 16 BRPM | OXYGEN SATURATION: 99 % | HEIGHT: 66 IN | SYSTOLIC BLOOD PRESSURE: 96 MMHG | TEMPERATURE: 98.1 F | BODY MASS INDEX: 32.14 KG/M2 | DIASTOLIC BLOOD PRESSURE: 62 MMHG

## 2019-05-24 DIAGNOSIS — E03.9 ACQUIRED HYPOTHYROIDISM: ICD-10-CM

## 2019-05-24 DIAGNOSIS — M76.31 IT BAND SYNDROME, RIGHT: ICD-10-CM

## 2019-05-24 DIAGNOSIS — Z00.00 WELLNESS EXAMINATION: Primary | ICD-10-CM

## 2019-05-24 DIAGNOSIS — E66.9 CLASS 1 OBESITY IN ADULT, UNSPECIFIED BMI, UNSPECIFIED OBESITY TYPE, UNSPECIFIED WHETHER SERIOUS COMORBIDITY PRESENT: ICD-10-CM

## 2019-05-24 DIAGNOSIS — Z23 ENCOUNTER FOR IMMUNIZATION: ICD-10-CM

## 2019-05-24 NOTE — PATIENT INSTRUCTIONS

## 2019-06-04 ENCOUNTER — HOSPITAL ENCOUNTER (OUTPATIENT)
Dept: PHYSICAL THERAPY | Age: 39
Discharge: HOME | End: 2019-06-04
Payer: COMMERCIAL

## 2019-06-04 DIAGNOSIS — M76.31 IT BAND SYNDROME, RIGHT: ICD-10-CM

## 2019-06-04 PROCEDURE — 97110 THERAPEUTIC EXERCISES: CPT | Performed by: PHYSICAL THERAPIST

## 2019-06-04 PROCEDURE — 97161 PT EVAL LOW COMPLEX 20 MIN: CPT | Performed by: PHYSICAL THERAPIST

## 2019-06-04 PROCEDURE — 97140 MANUAL THERAPY 1/> REGIONS: CPT | Performed by: PHYSICAL THERAPIST

## 2019-06-04 NOTE — PROGRESS NOTES
PT INITIAL EVALUATION NOTE - [facility] 2-15    Patient Name: Ashley Woods  Date:2019  : 1980  [x]  Patient  Verified  Payor: Apolinar Sorenson / Plan: Neto Linton / Product Type: HMO /    In time:0400  Out time:0500  Total Treatment Time (min): 60  Total Timed Codes (min): 60  1:1 Treatment Time ( [address] only): 60   Visit #: 1     Treatment Area: Right leg pain [M79.604]    SUBJECTIVE  Pain Level (0-10 scale): 5/10  Any medication changes, allergies to medications, adverse drug reactions, diagnosis change, or new procedure performed?: [] No    [x] Yes (see summary sheet for update)  Subjective:    Patient reports that started experincing knee painabout 2-3months. It is worse by crossing leg and improves with stretch. Denies numbness and tingling. Only able to perform 5-7 minutes of cross leg positoin for meditation daily. No x-rays performed at this time. Does not have pain with walking, squatting, sitting, stairs. PLOF: wnl  Mechanism of Injury: gradual onset  Previous Treatment/Compliance: n/a  PMHx/Surgical Hx: n/a  Work Hx:   Living Situation: 2 story with  and daughters  Pt Goals: to relieve pain so she can meditate  Barriers: none  Motivation:yes  Substance use: none  FABQ Score: -  Cognition: A & O x 4        OBJECTIVE/EXAMINATION  Posture:   Increase lordosis and genu recurvatum, hip er in stance  Other Observations:  Winking patellas  Gait and Functional Mobility:  Unable to perform functional squat without hip ir, genu valgum and anterior ELIJAH  Palpation: no tenderness along ITB, pain with patellar compression and lateral glide      knee ROM:         R   L  Flexion /DF   wnl   wsnl  Extension/PF   wnl    Hip  Abd    wnl   wnl  Add    wnl   wnl  IR/Sup/Ev   wnl   wnl  ER/Pron/Inv   wnl   wnl      LOWER QUARTER   MUSCLE STRENGTH  KEY       R  L  0 - No Contraction  L1, L2 Psoas  4  4  1 - Trace   L3 Quads  4  4  2 - Poor   L4 Tib Ant  5  5  3 - Fair    L5 EHL  5  5  4 - Good   S1 FHL  5  5  5 - Normal   S2 Hams  5  5          MMT: TrA: 4/5  Neurological: Reflexes / Sensations: Wnl  Special Tests:    Trendelenberg: + RAISA    Stork: -   Afshin: -     Hola: NEG   H.S. SLR: NEG     Piriformis Ext: NEG   Long Sit: -     Hip Scour: NEG   Knee Varus/Valgus Stress: NEG  Knee Lachmans: NEG   Other: patellar compression:+      15 min Therapeutic Exercise:  [x] See flow sheet :   Rationale: increase ROM, increase strength, improve coordination and improve balance to improve the patients ability to perform ADLs      15 min Manual Therapy: k-taping for patellar stability    Rationale: decrease pain, increase ROM, increase tissue extensibility, decrease trigger points and increase postural awareness to improve the patients ability to sit and bend without pain              With   [] TE   [] TA   [] neuro   [] other: Patient Education: [x] Review HEP    [] Progressed/Changed HEP based on:   [] positioning   [] body mechanics   [] transfers   [] heat/ice application    [] other:      Other Objective/Functional Measures: see above    Pain Level (0-10 scale) post treatment: 2/10    ASSESSMENT/Changes in Function:     [x]  See Plan of [facility], PT 6/4/2019

## 2019-06-07 NOTE — PROGRESS NOTES
[address] [facility]  Phone: [phone]  Fax: [phone]    Plan of Care/ Statement of Necessity for Physical Therapy Services 2-15    Patient name: Adamaris Moreno  : 1980  Provider#: [id number]  Referral source: Yeni Suárez MD      Medical/Treatment Diagnosis: Right leg pain [M79.604]     Prior Hospitalization: see medical history     Comorbidities: none  Prior Level of Function: see eval  Medications: Verified on Patient Summary List    Start of Care: 19      Onset Date: 3 months       The Plan of Care and following information is based on the information from the initial evaluation. Assessment/ key information: Assessment/ key information: Patient is a 42year old female who was referred to PT for bilateral knee pain ongoing for 3+ months. She presents with significant laxity throughout UE/LE (beighton scale 6/9), decreased strength and ROM of l-spine and LE, and pain. She does not present with signs and symptoms consistent with neural pathology.   She will benefit from skilled PT to address these problems so that she can return to [facility] at [facility].     Evaluation Complexity History LOW Complexity : Zero comorbidities / personal factors that will impact the outcome / POC; Examination LOW Complexity : 1-2 Standardized tests and measures addressing body structure, function, activity limitation and / or participation in recreation  ;Presentation LOW Complexity : Stable, uncomplicated  ;Clinical Decision Making LOW Complexity : FOTO score of   Overall Complexity Rating: LOW      Problem List: pain affecting function, decrease ROM, decrease strength, impaired gait/ balance, decrease ADL/ functional abilitiies, decrease activity tolerance and decrease flexibility/ joint mobility               Treatment Plan may include any combination of the following: Therapeutic exercise, Therapeutic activities, Neuromuscular re-education, Physical agent/modality, Gait/balance training, Manual therapy, Patient education and Functional mobility training  Patient / Family readiness to learn indicated by: asking questions, trying to perform skills and interest  Persons(s) to be included in education: patient (P)  Barriers to Learning/Limitations: None  Patient Goal (s): to eliminate pain  Patient Self Reported Health Status: good  Rehabilitation Potential: good      Short Term Goals: To be accomplished in 4 weeks: To reduce pain to <5/10 so patient can walk up down stairs without difficulty  To decrease swelling by 1 cm so patient can sit to drive without pain  To demonstrate independence with HEP  Long Term Goals: To be accomplished in 12 weeks: To increase LE strength to 5/5 without pain so patient can squat to the floor without difficulty  Reduce pain to < 2/10 so patient can stand throughout entire day without pain  Decrease swelling to minimal so patient can sleep through night with out pain  Increase lumbar strength to 5/5 so patient can twist, lift, bend without pain      Frequency / Duration: Patient to be seen 2 times per week for 12 weeks. Patient/ Caregiver education and instruction: self care, activity modification and exercises    [x]  Plan of care has been reviewed with YOSEF Tejada, PT 6/7/2019     ________________________________________________________________________    I certify that the above Therapy Services are being furnished while the patient is under my care. I agree with the treatment plan and certify that this therapy is necessary.     [de-identified] Signature:____________________  Date:____________Time: _________

## 2019-06-11 ENCOUNTER — HOSPITAL ENCOUNTER (OUTPATIENT)
Dept: PHYSICAL THERAPY | Age: 39
Discharge: HOME | End: 2019-06-11
Payer: COMMERCIAL

## 2019-06-11 PROCEDURE — 97110 THERAPEUTIC EXERCISES: CPT | Performed by: PHYSICAL THERAPIST

## 2019-06-11 NOTE — PROGRESS NOTES
PT DAILY TREATMENT NOTE 2-15    Patient Name: Guillermo Alfonso  Date:2019  : 1980  [x]  Patient  Verified  Payor: Tristen Kilpatrick / Plan: Jan Santamaria / Product Type: HMO /    In time:030  Out time:0400  Total Treatment Time (min): 60  Visit #: 2 Visit count could not be calculated. Make sure you are using a visit which is associated with an episode. Treatment Area: Right leg pain [M79.604]    SUBJECTIVE  Pain Level (0-10 scale): 1/10  Any medication changes, allergies to medications, adverse drug reactions, diagnosis change, or new procedure performed?: [x] No    [] Yes (see summary sheet for update)  Subjective functional status/changes:   [] No changes reported  Patient reports that she is still having pain when she meditates, but feeling good when she does her exercises.     OBJECTIVE    Modality rationale: decrease pain to improve the patients ability to walk/sit/squat   Min Type Additional Details      10 [] Estim: []Att   []Unatt    []TENS instruct                  []IFC  []Premod   []NMES                     []Other:  []w/US   [x]w/ice   []w/heat  Position:  Location:       []  Traction: [] Cervical       []Lumbar                       [] Prone          []Supine                       []Intermittent   []Continuous Lbs:  [] before manual  [] after manual  []w/heat    []  Ultrasound: []Continuous   [] Pulsed                       at: []1MHz   []3MHz Location:  W/cm2:    [] Paraffin         Location:   []w/heat    []  Ice     []  Heat  []  Ice massage Position:  Location:    []  Laser  []  Other: Position:  Location:      []  Vasopneumatic Device Pressure:       [] lo [] med [] hi   Temperature:      [x] Skin assessment post-treatment:  [x]intact []redness- no adverse reaction    []redness  adverse reaction:         55 min Therapeutic Exercise:  [x] See flow sheet :   Rationale: increase ROM, increase strength and improve coordination to improve the patients ability to to reduce pain with sitting and exercise                  With   [] TE   [] TA   [] neuro   [] other: Patient Education: [x] Review HEP    [] Progressed/Changed HEP based on:   [] positioning   [] body mechanics   [] transfers   [] heat/ice application    [] other:      Other Objective/Functional Measures: progressed therex per flow sheet, required some modifications for pain at ITB insertion     Pain Level (0-10 scale) post treatment: 0/10    ASSESSMENT/Changes in Function:   Patient tolerated treatment well today and was able to advance exercises without difficulty  Patient will continue to benefit from skilled PT services to modify and progress therapeutic interventions, address functional mobility deficits, address ROM deficits, address strength deficits, analyze and address soft tissue restrictions, analyze and cue movement patterns, analyze and modify body mechanics/ergonomics and assess and modify postural abnormalities to attain remaining goals.      []  See Plan of Care  []  See progress note/recertification  []  See Discharge Summary         Progress towards goals / Updated goals:  Continues to be a good candidate for achieving all PT goals    PLAN  [x]  Upgrade activities as tolerated     []  Continue plan of care  []  Update interventions per flow sheet       []  Discharge due to:_  []  Other:_      Olinda Leach, PT 6/11/2019

## 2019-06-26 ENCOUNTER — HOSPITAL ENCOUNTER (OUTPATIENT)
Dept: PHYSICAL THERAPY | Age: 39
Discharge: HOME | End: 2019-06-26
Payer: COMMERCIAL

## 2019-06-26 PROCEDURE — 97110 THERAPEUTIC EXERCISES: CPT | Performed by: PHYSICAL THERAPIST

## 2019-06-26 NOTE — PROGRESS NOTES
PT DAILY TREATMENT NOTE 2-15    Patient Name: Betty Hernandez  Date:2019  : 1980  [x]  Patient  Verified  Payor: Aubree Rocha / Plan: Asia Larsen / Product Type: HMO /    In time:300  Out time:0400  Total Treatment Time (min): 60  Visit #: 3  Treatment Area: Right leg pain [M79.604]    SUBJECTIVE  Pain Level (0-10 scale): 1/10  Any medication changes, allergies to medications, adverse drug reactions, diagnosis change, or new procedure performed?: [x] No    [] Yes (see summary sheet for update)  Subjective functional status/changes:   [] No changes reported  Patient reports that after vacation and driving to [facility] she is having more pain and tightness up and down the outside of right leg and knee.     OBJECTIVE    Modality rationale: decrease pain to improve the patients ability to walk/sit/squat   Min Type Additional Details      10 [] Estim: []Att   []Unatt    []TENS instruct                  []IFC  []Premod   []NMES                     []Other:  []w/US   [x]w/ice   []w/heat  Position:  Location:       []  Traction: [] Cervical       []Lumbar                       [] Prone          []Supine                       []Intermittent   []Continuous Lbs:  [] before manual  [] after manual  []w/heat    []  Ultrasound: []Continuous   [] Pulsed                       at: []1MHz   []3MHz Location:  W/cm2:    [] Paraffin         Location:   []w/heat    []  Ice     []  Heat  []  Ice massage Position:  Location:    []  Laser  []  Other: Position:  Location:      []  Vasopneumatic Device Pressure:       [] lo [] med [] hi   Temperature:      [x] Skin assessment post-treatment:  [x]intact []redness- no adverse reaction    []redness  adverse reaction:         55 min Therapeutic Exercise:  [x] See flow sheet : (with manual rolling of ITB (5 min)   Rationale: increase ROM, increase strength and improve coordination to improve the patients ability to to reduce pain with sitting and exercise With   [] TE   [] TA   [] neuro   [] other: Patient Education: [x] Review HEP    [] Progressed/Changed HEP based on:   [] positioning   [] body mechanics   [] transfers   [] heat/ice application    [] other:      Other Objective/Functional Measures: progressed therex per flow sheet, required some modifications for pain at ITB insertion. TTP along distal ITB today    Pain Level (0-10 scale) post treatment: 0/10    ASSESSMENT/Changes in Function:   Patient tolerated progression of LE strengthening well today. Required mirror and tactile FB to perform functional squatting and lunging correctly. Patient will continue to benefit from skilled PT services to modify and progress therapeutic interventions, address functional mobility deficits, address ROM deficits, address strength deficits, analyze and address soft tissue restrictions, analyze and cue movement patterns, analyze and modify body mechanics/ergonomics and assess and modify postural abnormalities to attain remaining goals.      []  See Plan of Care  []  See progress note/recertification  []  See Discharge Summary         Progress towards goals / Updated goals:  Continues to be a good candidate for achieving all PT goals    PLAN  [x]  Upgrade activities as tolerated     []  Continue plan of care  []  Update interventions per flow sheet       []  Discharge due to:_  []  Other:_      Demarcus Bullard, PT 6/26/2019

## 2019-07-02 ENCOUNTER — HOSPITAL ENCOUNTER (OUTPATIENT)
Dept: PHYSICAL THERAPY | Age: 39
Discharge: HOME | End: 2019-07-02
Payer: COMMERCIAL

## 2019-07-02 PROCEDURE — 97110 THERAPEUTIC EXERCISES: CPT | Performed by: PHYSICAL THERAPIST

## 2019-07-02 NOTE — PROGRESS NOTES
PT DAILY TREATMENT NOTE 2-15    Patient Name: Vikas Ask  Date:2019  : 1980  [x]  Patient  Verified  Payor: Ghazala Dietz / Plan: Louis Raymundo / Product Type: HMO /    In time:0250  Out time:350  Total Treatment Time (min): 60  Visit #: 4  Treatment Area: Right leg pain [M79.604]    SUBJECTIVE  Pain Level (0-10 scale): 3/10  Any medication changes, allergies to medications, adverse drug reactions, diagnosis change, or new procedure performed?: [x] No    [] Yes (see summary sheet for update)  Subjective functional status/changes:   [] No changes reported  Patient reports that the new exercises are working and her knee and ITB have not been as painful. She is tolerating 15 minutes of jose-cross position for meditation now    OBJECTIVE        55 min Therapeutic Exercise:  [x] See flow sheet : (with manual rolling of ITB (5 min)   Rationale: increase ROM, increase strength and improve coordination to improve the patients ability to to reduce pain with sitting and exercise                  With   [] TE   [] TA   [] neuro   [] other: Patient Education: [x] Review HEP    [] Progressed/Changed HEP based on:   [] positioning   [] body mechanics   [] transfers   [] heat/ice application    [] other:      Other Objective/Functional Measures:   Progressed therex per flow sheet. Requires visual fb for functional squat    Pain Level (0-10 scale) post treatment: 0/10    ASSESSMENT/Changes in Function:   Patient tolerated progression of LE strengthening well today. Required mirror and tactile FB to perform functional squatting and lunging correctly.      Patient will continue to benefit from skilled PT services to modify and progress therapeutic interventions, address functional mobility deficits, address ROM deficits, address strength deficits, analyze and address soft tissue restrictions, analyze and cue movement patterns, analyze and modify body mechanics/ergonomics and assess and modify postural abnormalities to attain remaining goals.      []  See Plan of Care  []  See progress note/recertification  []  See Discharge Summary         Progress towards goals / Updated goals:  Continues to be a good candidate for achieving all PT goals    PLAN  [x]  Upgrade activities as tolerated     []  Continue plan of care  []  Update interventions per flow sheet       []  Discharge due to:_  []  Other:_      Verito Mitchell, PT 7/2/2019

## 2019-07-05 ENCOUNTER — HOSPITAL ENCOUNTER (OUTPATIENT)
Dept: PHYSICAL THERAPY | Age: 39
Discharge: HOME | End: 2019-07-05
Payer: COMMERCIAL

## 2019-07-05 PROCEDURE — 97110 THERAPEUTIC EXERCISES: CPT | Performed by: PHYSICAL THERAPIST

## 2019-07-05 NOTE — PROGRESS NOTES
PT DAILY TREATMENT NOTE 2-15    Patient Name: Alexandru Cheng  Date:2019  : 1980  [x]  Patient  Verified  Payor: Jerry Payton / Plan: Paz Rather / Product Type: HMO /    In time:1130  Out time:1230  Total Treatment Time (min): 60  Visit #: 5  Treatment Area: Right leg pain [M79.604]    SUBJECTIVE  Pain Level (0-10 scale): 3/10  Any medication changes, allergies to medications, adverse drug reactions, diagnosis change, or new procedure performed?: [x] No    [] Yes (see summary sheet for update)  Subjective functional status/changes:   [] No changes reported  Patient reports that her right hip and leg are still pain free. She walked 17,000 steps at [facility] yesterday without difficulty. She is still avoiding crisscross position but will likely start meditating again next week in short intervals    OBJECTIVE        55 min Therapeutic Exercise:  [x] See flow sheet : (with manual rolling of ITB (5 min)   Rationale: increase ROM, increase strength and improve coordination to improve the patients ability to to reduce pain with sitting and exercise                  With   [] TE   [] TA   [] neuro   [] other: Patient Education: [x] Review HEP    [] Progressed/Changed HEP based on:   [] positioning   [] body mechanics   [] transfers   [] heat/ice application    [] other:      Other Objective/Functional Measures:   Patient tolerated increased reps of her exercises without pain today. Pain Level (0-10 scale) post treatment: 0/10    ASSESSMENT/Changes in Function:   Patient continues to make steady progress towards her strength goals.   She was encouraged to add short bouts of crisscross stretch for meditating (5min/day)     Patient will continue to benefit from skilled PT services to modify and progress therapeutic interventions, address functional mobility deficits, address ROM deficits, address strength deficits, analyze and address soft tissue restrictions, analyze and cue movement patterns, analyze and modify body mechanics/ergonomics and assess and modify postural abnormalities to attain remaining goals.      []  See Plan of Care  []  See progress note/recertification  []  See Discharge Summary         Progress towards goals / Updated goals:  Continues to be a good candidate for achieving all PT goals    PLAN  [x]  Upgrade activities as tolerated     []  Continue plan of care  []  Update interventions per flow sheet       []  Discharge due to:_  []  Other:_      Pimentel Wagner, PT 7/5/2019

## 2019-07-09 ENCOUNTER — HOSPITAL ENCOUNTER (OUTPATIENT)
Dept: PHYSICAL THERAPY | Age: 39
Discharge: HOME | End: 2019-07-09
Payer: COMMERCIAL

## 2019-07-09 PROCEDURE — 97110 THERAPEUTIC EXERCISES: CPT | Performed by: PHYSICAL MEDICINE & REHABILITATION

## 2019-07-09 NOTE — PROGRESS NOTES
PT DAILY TREATMENT NOTE 2-15    Patient Name: Kyra Marquez  Date:2019  : 1980  [x]  Patient  Verified  Payor: Wade Getachew / Plan: Elin Peter / Product Type: HMO /    In time:1030  Out time:1135  Total Treatment Time (min): 65  Visit #: 6  Treatment Area: Right leg pain [M79.604]    SUBJECTIVE  Pain Level (0-10 scale): 3/10  Any medication changes, allergies to medications, adverse drug reactions, diagnosis change, or new procedure performed?: [x] No    [] Yes (see summary sheet for update)  Subjective functional status/changes:   [] No changes reported  Patient reports that she continues to do very well.      OBJECTIVE    Modality rationale: decrease pain to improve the patients ability to walk/sit/squat    Min Type Additional Details       [] Estim: []Att   []Unatt    []TENS instruct                  []IFC  []Premod   []NMES                     []Other:  []w/US   [x]w/ice   []w/heat  Position:  Location:        []  Traction: [] Cervical       []Lumbar                       [] Prone          []Supine                       []Intermittent   []Continuous Lbs:  [] before manual  [] after manual  []w/heat     []  Ultrasound: []Continuous   [] Pulsed                       at: []1MHz   []3MHz Location:  W/cm2:     [] Paraffin         Location:   []w/heat   10  []  Ice     [x]  Heat  []  Ice massage Position: L SL  Location: R hip     []  Laser  []  Other: Position:  Location:        []  Vasopneumatic Device Pressure:       [] lo [] med [] hi   Temperature:       [x] Skin assessment post-treatment:  [x]intact []redness- no adverse reaction    []redness  adverse reaction:       55 min Therapeutic Exercise:  [x] See flow sheet :    Rationale: increase ROM, increase strength and improve coordination to improve the patients ability to to reduce pain with sitting and exercise                  With   [] TE   [] TA   [] neuro   [] other: Patient Education: [x] Review HEP    [] Progressed/Changed HEP based on:   [] positioning   [] body mechanics   [] transfers   [] heat/ice application    [] other:      Other Objective/Functional Measures: Mod fatigue with glute exercises today. Pain Level (0-10 scale) post treatment: 0/10    ASSESSMENT/Changes in Function:     Patient will continue to benefit from skilled PT services to modify and progress therapeutic interventions, address functional mobility deficits, address ROM deficits, address strength deficits, analyze and address soft tissue restrictions, analyze and cue movement patterns, analyze and modify body mechanics/ergonomics and assess and modify postural abnormalities to attain remaining goals.      []  See Plan of Care  []  See progress note/recertification  []  See Discharge Summary         Progress towards goals / Updated goals:  Continues to be a good candidate for achieving all PT goals    PLAN  [x]  Upgrade activities as tolerated     [x]  Continue plan of care  [x]  Update interventions per flow sheet       []  Discharge due to:_  []  Other:_      Sivan Galarza PTA, CPT 7/9/2019

## 2019-07-11 ENCOUNTER — HOSPITAL ENCOUNTER (OUTPATIENT)
Dept: PHYSICAL THERAPY | Age: 39
Discharge: HOME | End: 2019-07-11
Payer: COMMERCIAL

## 2019-07-11 PROCEDURE — 97110 THERAPEUTIC EXERCISES: CPT

## 2019-07-11 NOTE — PROGRESS NOTES
PT DAILY TREATMENT NOTE 2-15    Patient Name: Sarthak Harris  Date:2019  : 1980  [x]  Patient  Verified  Payor: Pheobe Cabot / Plan: Viridiana Dominique / Product Type: HMO /    In time:9:30a  Out time:10:35p   Total Treatment Time (min): 55  Visit #: 6  Treatment Area: Right leg pain [M79.604]    SUBJECTIVE  Pain Level (0-10 scale): 3-4/10  Any medication changes, allergies to medications, adverse drug reactions, diagnosis change, or new procedure performed?: [x] No    [] Yes (see summary sheet for update)  Subjective functional status/changes:   [] No changes reported  Patient reports crossing her right ankle over her left knee continues to aggravate her pain. Patient states after she has been exercising she is able to cross her leg without pain.     OBJECTIVE    Modality rationale: decrease pain to improve the patients ability to walk/sit/squat    Min Type Additional Details       [] Estim: []Att   []Unatt    []TENS instruct                  []IFC  []Premod   []NMES                     []Other:  []w/US   [x]w/ice   []w/heat  Position:  Location:        []  Traction: [] Cervical       []Lumbar                       [] Prone          []Supine                       []Intermittent   []Continuous Lbs:  [] before manual  [] after manual  []w/heat     []  Ultrasound: []Continuous   [] Pulsed                       at: []1MHz   []3MHz Location:  W/cm2:     [] Paraffin         Location:   []w/heat   10  []  Ice     [x]  Heat  []  Ice massage Position: L SL  Location: R hip     []  Laser  []  Other: Position:  Location:        []  Vasopneumatic Device Pressure:       [] lo [] med [] hi   Temperature:       [x] Skin assessment post-treatment:  [x]intact []redness- no adverse reaction    []redness  adverse reaction:       55 min Therapeutic Exercise:  [x] See flow sheet :    Rationale: increase ROM, increase strength and improve coordination to improve the patients ability to to reduce pain with sitting and exercise                  With   [] TE   [] TA   [] neuro   [] other: Patient Education: [x] Review HEP    [] Progressed/Changed HEP based on:   [] positioning   [] body mechanics   [] transfers   [] heat/ice application    [] other:      Other Objective/Functional Measures: Mod fatigue with glute exercises today. Pain Level (0-10 scale) post treatment: 0/10    ASSESSMENT/Changes in Function:     Patient will continue to benefit from skilled PT services to modify and progress therapeutic interventions, address functional mobility deficits, address ROM deficits, address strength deficits, analyze and address soft tissue restrictions, analyze and cue movement patterns, analyze and modify body mechanics/ergonomics and assess and modify postural abnormalities to attain remaining goals. []  See Plan of Care  []  See progress note/recertification  []  See Discharge Summary         Progress towards goals / Updated goals:  Patient demonstrates good tolerance with exercises with mod verbal cues for proper exercise reproduction.     PLAN  [x]  Upgrade activities as tolerated     [x]  Continue plan of care  [x]  Update interventions per flow sheet       []  Discharge due to:_  []  Other:_      Kaila Darling, PTA 7/11/2019

## 2019-07-15 ENCOUNTER — APPOINTMENT (OUTPATIENT)
Dept: PHYSICAL THERAPY | Age: 39
End: 2019-07-15
Payer: COMMERCIAL

## 2019-07-17 ENCOUNTER — HOSPITAL ENCOUNTER (OUTPATIENT)
Dept: PHYSICAL THERAPY | Age: 39
Discharge: HOME | End: 2019-07-17
Payer: COMMERCIAL

## 2019-07-17 PROCEDURE — 97016 VASOPNEUMATIC DEVICE THERAPY: CPT | Performed by: PHYSICAL MEDICINE & REHABILITATION

## 2019-07-17 PROCEDURE — 97140 MANUAL THERAPY 1/> REGIONS: CPT | Performed by: PHYSICAL MEDICINE & REHABILITATION

## 2019-07-17 PROCEDURE — 97110 THERAPEUTIC EXERCISES: CPT | Performed by: PHYSICAL MEDICINE & REHABILITATION

## 2019-07-17 NOTE — PROGRESS NOTES
PT DAILY TREATMENT NOTE 2-15    Patient Name: Venkat Hernandes  Date:2019  : 1980  [x]  Patient  Verified  Payor: Nate Novak / Plan: Ada Meadows / Product Type: HMO /    In time:1005a  Out time:1115p   Total Treatment Time (min): 70  Visit #: 6  Treatment Area: Right leg pain [M79.604]    SUBJECTIVE  Pain Level (0-10 scale): 3/10  Any medication changes, allergies to medications, adverse drug reactions, diagnosis change, or new procedure performed?: [x] No    [] Yes (see summary sheet for update)  Subjective functional status/changes:   [] No changes reported  Patient reports she is still having trouble crossing her RLE over.     OBJECTIVE    Modality rationale: decrease pain to improve the patients ability to walk/sit/squat    Min Type Additional Details       [] Estim: []Att   []Unatt    []TENS instruct                  []IFC  []Premod   []NMES                     []Other:  []w/US   [x]w/ice   []w/heat  Position:  Location:        []  Traction: [] Cervical       []Lumbar                       [] Prone          []Supine                       []Intermittent   []Continuous Lbs:  [] before manual  [] after manual  []w/heat     []  Ultrasound: []Continuous   [] Pulsed                       at: []1MHz   []3MHz Location:  W/cm2:     [] Paraffin         Location:   []w/heat    []  Ice     []  Heat  []  Ice massage Position: L SL  Location: R hip     []  Laser  []  Other: Position:  Location:   15     [x]  Vasopneumatic Device Pressure:       [] lo [x] med [] hi   Temperature: 34      [x] Skin assessment post-treatment:  [x]intact []redness- no adverse reaction    []redness  adverse reaction:       40 min Therapeutic Exercise:  [x] See flow sheet :    Rationale: increase ROM, increase strength and improve coordination to improve the patients ability to to reduce pain with sitting and exercise  15 min Manual Therapy : Therastick to R ITB    Rationale: increase ROM, increase strength and improve coordination to improve the patients ability to to reduce pain with sitting and exercise       With   [] TE   [] TA   [] neuro   [] other: Patient Education: [x] Review HEP    [] Progressed/Changed HEP based on:   [] positioning   [] body mechanics   [] transfers   [] heat/ice application    [] other:      Other Objective/Functional Measures:   Updated HEP     Decrease in pain with manual therapy today. Pain Level (0-10 scale) post treatment: 0/10    ASSESSMENT/Changes in Function:     Patient will continue to benefit from skilled PT services to modify and progress therapeutic interventions, address functional mobility deficits, address ROM deficits, address strength deficits, analyze and address soft tissue restrictions, analyze and cue movement patterns, analyze and modify body mechanics/ergonomics and assess and modify postural abnormalities to attain remaining goals. []  See Plan of Care  []  See progress note/recertification  []  See Discharge Summary         Progress towards goals / Updated goals:  Patient demonstrates good tolerance with exercises with mod verbal cues for proper exercise reproduction.     PLAN  [x]  Upgrade activities as tolerated     [x]  Continue plan of care  [x]  Update interventions per flow sheet       []  Discharge due to:_  []  Other:_      Parish Elena PTA, CPT 7/17/2019

## 2019-08-06 ENCOUNTER — APPOINTMENT (OUTPATIENT)
Dept: PHYSICAL THERAPY | Age: 39
End: 2019-08-06

## 2019-08-09 ENCOUNTER — APPOINTMENT (OUTPATIENT)
Dept: PHYSICAL THERAPY | Age: 39
End: 2019-08-09

## 2019-08-12 ENCOUNTER — APPOINTMENT (OUTPATIENT)
Dept: PHYSICAL THERAPY | Age: 39
End: 2019-08-12

## 2019-08-14 ENCOUNTER — APPOINTMENT (OUTPATIENT)
Dept: PHYSICAL THERAPY | Age: 39
End: 2019-08-14

## 2019-08-28 NOTE — PROGRESS NOTES
[address]  Phone: [phone]  Fax: [phone]    Discharge Summary  2-15    Patient name: Giuliano Magaña  : 1980  Provider#: [id number]  Referral source: Fay Juarez MD      Medical/Treatment Diagnosis: Right leg pain [M79.604]     Prior Hospitalization: see medical history     Comorbidities: See Plan of Care  Prior Level of Function:See Plan of Care  Medications: Verified on Patient Summary List    Start of Care: 19      Onset Date:1 month prior   Visits from Start of Care: 8     Missed Visits: 0  Reporting Period : 19 to 19      ASSESSMENT/SUMMARY OF CARE: Per last patient note patient has achieved all subjective PT goals. Provided with updated HEP for continued Sx management.           RECOMMENDATIONS:  [x]Discontinue therapy: [x]Patient has reached or is progressing toward set goals      []Patient is non-compliant or has abdicated      []Due to lack of appreciable progress towards set goals      []Zeke Zhu, PT 2019

## 2019-11-21 ENCOUNTER — OFFICE VISIT (OUTPATIENT)
Dept: INTERNAL MEDICINE CLINIC | Age: 39
End: 2019-11-21

## 2019-11-21 ENCOUNTER — HOSPITAL ENCOUNTER (OUTPATIENT)
Dept: LAB | Age: 39
Discharge: HOME | End: 2019-11-21

## 2019-11-21 VITALS
HEIGHT: 66 IN | DIASTOLIC BLOOD PRESSURE: 78 MMHG | WEIGHT: 216 LBS | SYSTOLIC BLOOD PRESSURE: 122 MMHG | BODY MASS INDEX: 34.72 KG/M2 | HEART RATE: 77 BPM | TEMPERATURE: 97.7 F | RESPIRATION RATE: 16 BRPM

## 2019-11-21 DIAGNOSIS — E03.9 ACQUIRED HYPOTHYROIDISM: Primary | ICD-10-CM

## 2019-11-21 DIAGNOSIS — E66.9 CLASS 1 OBESITY IN ADULT, UNSPECIFIED BMI, UNSPECIFIED OBESITY TYPE, UNSPECIFIED WHETHER SERIOUS COMORBIDITY PRESENT: ICD-10-CM

## 2019-11-21 DIAGNOSIS — M76.31 IT BAND SYNDROME, RIGHT: ICD-10-CM

## 2019-11-21 RX ORDER — LEVOTHYROXINE SODIUM 75 UG/1
75 TABLET ORAL
Qty: 90 TAB | Refills: 1 | Status: SHIPPED | OUTPATIENT
Start: 2019-11-21 | End: 2020-05-11 | Stop reason: SDUPTHER

## 2019-11-21 RX ORDER — DICLOFENAC SODIUM 75 MG/1
75 TABLET, DELAYED RELEASE ORAL
Qty: 60 TAB | Refills: 1 | Status: SHIPPED | OUTPATIENT
Start: 2019-11-21 | End: 2020-11-05 | Stop reason: ALTCHOICE

## 2019-11-21 NOTE — PROGRESS NOTES
Aurelio Roberts is a 42 y.o. female who presents today for Medication Evaluation and Hypothyroidism  . She has a history of   Patient Active Problem List   Diagnosis Code    Acquired hypothyroidism E03.9    Obesity E66.9   . Today patient is here for follow-up. .     Had a perianal cyst that had to be excised. Healing from this took some time. Exercise has decreased. IT Band: Still having some issues. She notes that days when she is exercising is much better. She has not taken any anti-inflammatories for this. She did do physical therapy in the past.    Hypothyroidism: Patient is been continued on levothyroxine. Obesity: Patient did a short course of phentermine. She successfully has been able to lose and maintain some, but has regained. ROS  Review of Systems   Constitutional: Negative for chills, fever, malaise/fatigue and weight loss. Weight gain. HENT: Negative for congestion and sore throat. Eyes: Negative for blurred vision, double vision, photophobia and pain. Respiratory: Negative for cough and shortness of breath. Cardiovascular: Negative for chest pain, palpitations, orthopnea and leg swelling. Gastrointestinal: Negative for abdominal pain, constipation, diarrhea, heartburn, nausea and vomiting. Genitourinary: Negative for dysuria, frequency and urgency. Musculoskeletal: Negative for joint pain and myalgias. Skin: Negative for rash. Neurological: Negative. Negative for headaches. Endo/Heme/Allergies: Does not bruise/bleed easily. Psychiatric/Behavioral: Negative for memory loss and suicidal ideas. Visit Vitals  /78   Pulse 77   Temp 97.7 °F (36.5 °C)   Resp 16   Ht 5' 6\" (1.676 m)   Wt 216 lb (98 kg)   BMI 34.86 kg/m²       Physical Exam  Constitutional:       Appearance: She is well-developed. HENT:      Head: Normocephalic and atraumatic. Cardiovascular:      Rate and Rhythm: Normal rate and regular rhythm. Heart sounds:  No murmur. Pulmonary:      Effort: Pulmonary effort is normal. No respiratory distress. Skin:     General: Skin is warm and dry. Neurological:      Mental Status: She is alert and oriented to person, place, and time. Psychiatric:         Behavior: Behavior normal.           Current Outpatient Medications   Medication Sig    diclofenac EC (VOLTAREN) 75 mg EC tablet Take 1 Tab by mouth two (2) times daily as needed for Pain.  levothyroxine (SYNTHROID) 75 mcg tablet Take 1 Tab by mouth Daily (before breakfast).  phentermine (ADIPEX-P) 37.5 mg tablet Take 1 Tab by mouth every morning. Max Daily Amount: 37.5 mg.    multivitamin (ONE A DAY) tablet Take 1 Tab by mouth daily.  hydrocortisone-pramoxine (PROCTOFOAM HC) rectal foam Insert 1 Applicator into rectum two (2) times a day. No current facility-administered medications for this visit. History reviewed. No pertinent past medical history. History reviewed. No pertinent surgical history. Social History     Tobacco Use    Smoking status: Never Smoker    Smokeless tobacco: Never Used   Substance Use Topics    Alcohol use: Yes     Comment: 4-5 glasses per year      Family History   Problem Relation Age of Onset    Hypertension Mother     Lung Disease Mother         TB in '01    Elevated Lipids Father     Stroke Maternal Grandmother     Arthritis-osteo Maternal Grandfather     Cancer Maternal Grandfather 80        colon ca    Diabetes Maternal Grandfather     Elevated Lipids Maternal Grandfather         No Known Allergies     Assessment/Plan  Diagnoses and all orders for this visit:    1. Acquired hypothyroidism -repeat today. If still above 3 will consider increasing dose.  -     TSH 3RD GENERATION; Future  -     T4, FREE; Future  -     levothyroxine (SYNTHROID) 75 mcg tablet; Take 1 Tab by mouth Daily (before breakfast).     2. Class 1 obesity in adult, unspecified BMI, unspecified obesity type, unspecified whether serious comorbidity present -patient has regained some weight following procedure due to an anal cyst.  She is planning on getting back into physical activity. 3. It band syndrome, right -still having some issues. We discussed doing a 2-week course of anti-inflammatories while resuming stretches to see if this can improve overall.  -     diclofenac EC (VOLTAREN) 75 mg EC tablet; Take 1 Tab by mouth two (2) times daily as needed for Pain. Follow-up and Dispositions    · Return in about 6 months (around 5/21/2020). Juanjose Elena MD  11/21/2019    This note was created with the help of speech recognition software Perry Ray) and may contain some 'sound alike' errors.

## 2019-11-22 LAB
T4 FREE SERPL-MCNC: 1.48 NG/DL (ref 0.82–1.77)
TSH SERPL DL<=0.005 MIU/L-ACNC: 2.69 UIU/ML (ref 0.45–4.5)

## 2020-05-11 DIAGNOSIS — E03.9 ACQUIRED HYPOTHYROIDISM: ICD-10-CM

## 2020-05-11 RX ORDER — LEVOTHYROXINE SODIUM 75 UG/1
75 TABLET ORAL
Qty: 90 TAB | Refills: 1 | Status: SHIPPED | OUTPATIENT
Start: 2020-05-11 | End: 2020-05-13 | Stop reason: SDUPTHER

## 2020-05-13 ENCOUNTER — VIRTUAL VISIT (OUTPATIENT)
Dept: INTERNAL MEDICINE CLINIC | Age: 40
End: 2020-05-13

## 2020-05-13 VITALS — BODY MASS INDEX: 33.27 KG/M2 | HEIGHT: 66 IN | WEIGHT: 207 LBS

## 2020-05-13 DIAGNOSIS — E03.9 ACQUIRED HYPOTHYROIDISM: Primary | ICD-10-CM

## 2020-05-13 DIAGNOSIS — E66.9 CLASS 1 OBESITY IN ADULT, UNSPECIFIED BMI, UNSPECIFIED OBESITY TYPE, UNSPECIFIED WHETHER SERIOUS COMORBIDITY PRESENT: ICD-10-CM

## 2020-05-13 RX ORDER — LEVOTHYROXINE SODIUM 75 UG/1
75 TABLET ORAL
Qty: 90 TAB | Refills: 1 | Status: SHIPPED | OUTPATIENT
Start: 2020-05-13 | End: 2020-11-05

## 2020-05-13 RX ORDER — PHENTERMINE HYDROCHLORIDE 37.5 MG/1
37.5 TABLET ORAL
Qty: 30 TAB | Refills: 3 | Status: SHIPPED | OUTPATIENT
Start: 2020-05-13 | End: 2020-11-05 | Stop reason: ALTCHOICE

## 2020-05-13 NOTE — PROGRESS NOTES
Johny Augustin was seen on 5/13/2020 using synchronous (real-time) audio technology; telephone. Consent: Johny Augustin, who was seen by synchronous (real-time) audio-video technology, and/or her healthcare decision maker, is aware that this patient-initiated, Telehealth encounter on 5/13/2020 is a billable service, with coverage as determined by her insurance carrier. She is aware that she may receive a bill and has provided verbal consent to proceed: Yes. I was in the office while conducting this encounter. Johny Augustin is a 47 y.o. female who presents today for Medication Refill and Weight Management  . She has a history of   Patient Active Problem List   Diagnosis Code    Acquired hypothyroidism E03.9    Obesity E66.9   . Today patient is being seen for follow-up. .     Obesity: Patient is struggled with her weight for some time. Treatment of subclinical hypothyroidism has helped. She is also done a course of phentermine last year which did help drop some weight. Currently her weight has been stable but she is been unable to lose more weight. She would like to do another 4-month course of phentermine. She has tolerated this medication in the past and her blood pressures remained relatively stable throughout this. She is still working on diet and exercising regularly. [place] in [place] [id number]. ROS  Review of Systems   Constitutional: Negative for chills, fever and weight loss. HENT: Negative for congestion and sore throat. Eyes: Negative for blurred vision, double vision, photophobia and pain. Respiratory: Negative for cough and shortness of breath. Cardiovascular: Negative for chest pain, palpitations, orthopnea, claudication and leg swelling. Gastrointestinal: Negative for abdominal pain, constipation, diarrhea, heartburn, nausea and vomiting. Genitourinary: Negative for dysuria, frequency and urgency.    Musculoskeletal: Negative for back pain, joint pain, myalgias and neck pain. Skin: Negative for rash. Neurological: Negative. Negative for headaches. Endo/Heme/Allergies: Does not bruise/bleed easily. Psychiatric/Behavioral: Negative for depression, hallucinations, memory loss, substance abuse and suicidal ideas. Visit Vitals  Ht 5' 6\" (1.676 m)   Wt 207 lb (93.9 kg)   BMI 33.41 kg/m²       Physical Exam  Pulmonary:      Comments: Speaking in full sentences. Neurological:      Mental Status: She is alert. Psychiatric:         Mood and Affect: Mood normal.           Current Outpatient Medications   Medication Sig    levothyroxine (SYNTHROID) 75 mcg tablet Take 1 Tab by mouth Daily (before breakfast).  multivitamin (ONE A DAY) tablet Take 1 Tab by mouth daily.  diclofenac EC (VOLTAREN) 75 mg EC tablet Take 1 Tab by mouth two (2) times daily as needed for Pain.  phentermine (ADIPEX-P) 37.5 mg tablet Take 1 Tab by mouth every morning. Max Daily Amount: 37.5 mg.    hydrocortisone-pramoxine (PROCTOFOAM HC) rectal foam Insert 1 Applicator into rectum two (2) times a day. No current facility-administered medications for this visit. History reviewed. No pertinent past medical history. History reviewed. No pertinent surgical history. Social History     Tobacco Use    Smoking status: Never Smoker    Smokeless tobacco: Never Used   Substance Use Topics    Alcohol use: Yes     Comment: 4-5 glasses per year      Family History   Problem Relation Age of Onset    Hypertension Mother     Lung Disease Mother         TB in '01    Elevated Lipids Father     Stroke Maternal Grandmother     Arthritis-osteo Maternal Grandfather     Cancer Maternal Grandfather 80        colon ca    Diabetes Maternal Grandfather     Elevated Lipids Maternal Grandfather         No Known Allergies     Assessment/Plan  Diagnoses and all orders for this visit:    1. Acquired hypothyroidism-for thyroid check.   She will come in to have his blood work done.  -     TSH 3RD GENERATION; Future  -     levothyroxine (SYNTHROID) 75 mcg tablet; Take 1 Tab by mouth Daily (before breakfast). 2. Class 1 obesity in adult, unspecified BMI, unspecified obesity type, unspecified whether serious comorbidity present-we will repeat a 4-month course of phentermine. -     phentermine (ADIPEX-P) 37.5 mg tablet; Take 1 Tab by mouth every morning. Max Daily Amount: 37.5 mg.      8 minutes were spent on the phone with patient. Beatriz Lennox is a 47 y.o. female being evaluated by a video visit encounter for concerns as above. A caregiver was present when appropriate. Due to this being a TeleHealth encounter (During [facility]-11 public health emergency), evaluation of the following organ systems was limited: Vitals/Constitutional/EENT/Resp/CV/GI//MS/Neuro/Skin/Heme-Lymph-Imm. Pursuant to the emergency declaration under the [facility], 1135 waiver authority and the [employer] Act, this Virtual  Visit was conducted, with patient's (and/or legal guardian's) consent, to reduce the patient's risk of exposure to COVID-19 and provide necessary medical care. Services were provided through a video synchronous discussion virtually to substitute for in-person clinic visit. Patient and provider were located at their individual homes. Jacob Walsh MD  5/13/2020    This note was created with the help of speech recognition software Ananya Sequin) and may contain some 'sound alike' errors.

## 2020-05-13 NOTE — PROGRESS NOTES
Pt would like a refill of Phentermine, last taken about 6 months ago. Pt used at home scale for her VV today. Pt doesn't have a BP machine or thermometer.

## 2020-05-15 LAB — TSH SERPL DL<=0.005 MIU/L-ACNC: 1.43 UIU/ML (ref 0.45–4.5)

## 2020-05-26 ENCOUNTER — TELEPHONE (OUTPATIENT)
Dept: INTERNAL MEDICINE CLINIC | Age: 40
End: 2020-05-26

## 2020-05-26 NOTE — TELEPHONE ENCOUNTER
Please call [facility] about a prior auth that was denied/ [phone] X [phone] Lam Tristan     Wants chart notes fax to get approved/ fax [phone]

## 2020-05-28 NOTE — TELEPHONE ENCOUNTER
----- Message from Keiko Dave sent at 2020  4:50 PM EDT -----  Regarding: / Serafin / telephone  Patient's first and last Andrea Manriquez   : 1980  ID numbers:#[id number] [id number]  Caller's first and last name:Brenda from [address] [facility]   Reason for call:[facility] has not received the Pt's notes that were faxed to them.   Callback required yes/no and why:yes  Best contact number(s):[phone]  Details to clarify the request:n/a

## 2020-11-05 ENCOUNTER — VIRTUAL VISIT (OUTPATIENT)
Dept: INTERNAL MEDICINE CLINIC | Age: 40
End: 2020-11-05
Payer: COMMERCIAL

## 2020-11-05 DIAGNOSIS — E03.9 ACQUIRED HYPOTHYROIDISM: Primary | ICD-10-CM

## 2020-11-05 DIAGNOSIS — E03.9 ACQUIRED HYPOTHYROIDISM: ICD-10-CM

## 2020-11-05 DIAGNOSIS — E66.9 CLASS 1 OBESITY IN ADULT, UNSPECIFIED BMI, UNSPECIFIED OBESITY TYPE, UNSPECIFIED WHETHER SERIOUS COMORBIDITY PRESENT: ICD-10-CM

## 2020-11-05 PROCEDURE — 99213 OFFICE O/P EST LOW 20 MIN: CPT | Performed by: INTERNAL MEDICINE

## 2020-11-05 RX ORDER — LEVOTHYROXINE SODIUM 75 UG/1
75 TABLET ORAL
Qty: 90 TAB | Refills: 1 | Status: CANCELLED | OUTPATIENT
Start: 2020-11-05

## 2020-11-05 RX ORDER — LEVOTHYROXINE SODIUM 50 UG/1
50 TABLET ORAL
Qty: 90 TAB | Refills: 1 | Status: SHIPPED | OUTPATIENT
Start: 2020-11-05 | End: 2021-05-24

## 2021-01-16 LAB
T4 FREE SERPL-MCNC: 1.36 NG/DL (ref 0.82–1.77)
TSH SERPL DL<=0.005 MIU/L-ACNC: 2.69 UIU/ML (ref 0.45–4.5)

## 2021-08-04 ENCOUNTER — OFFICE VISIT (OUTPATIENT)
Dept: INTERNAL MEDICINE CLINIC | Age: 41
End: 2021-08-04
Payer: COMMERCIAL

## 2021-08-04 VITALS
OXYGEN SATURATION: 99 % | WEIGHT: 222.2 LBS | HEART RATE: 58 BPM | RESPIRATION RATE: 14 BRPM | TEMPERATURE: 98.2 F | HEIGHT: 66 IN | DIASTOLIC BLOOD PRESSURE: 82 MMHG | BODY MASS INDEX: 35.71 KG/M2 | SYSTOLIC BLOOD PRESSURE: 122 MMHG

## 2021-08-04 DIAGNOSIS — Z00.00 WELLNESS EXAMINATION: Primary | ICD-10-CM

## 2021-08-04 DIAGNOSIS — E03.9 ACQUIRED HYPOTHYROIDISM: ICD-10-CM

## 2021-08-04 DIAGNOSIS — E66.9 CLASS 2 OBESITY IN ADULT, UNSPECIFIED BMI, UNSPECIFIED OBESITY TYPE, UNSPECIFIED WHETHER SERIOUS COMORBIDITY PRESENT: ICD-10-CM

## 2021-08-04 PROCEDURE — 99395 PREV VISIT EST AGE 18-39: CPT | Performed by: INTERNAL MEDICINE

## 2021-08-04 RX ORDER — PHENTERMINE HYDROCHLORIDE 37.5 MG/1
37.5 TABLET ORAL
Qty: 30 TABLET | Refills: 3 | Status: SHIPPED | OUTPATIENT
Start: 2021-08-04 | End: 2022-09-08 | Stop reason: ALTCHOICE

## 2021-08-04 NOTE — PROGRESS NOTES
Gilma Laguerre  Identified pt with two pt identifiers(name and ). Chief Complaint   Patient presents with    Complete Physical     RM21//pt presents today for annual CPE; evaluate thyroid stopped thyroid medication 8 weeks ago, discuss weight loss options        1. Have you been to the ER, urgent care clinic since your last visit? Hospitalized since your last visit? NO    2. Have you seen or consulted any other health care providers outside of the [address] [place] [address] since your last visit? Include any pap smears or colon screening. NO      Provider notified of reason for visit, vitals and flowsheets obtained on patients. Patient received paperwork for advance directive during previous visit but has not completed at this time     Reviewed record In preparation for visit, huddled with provider and have obtained necessary documentation      Health Maintenance Due   Topic    Hepatitis C Screening     PAP AKA CERVICAL CYTOLOGY        Wt Readings from Last 3 Encounters:   21 222 lb 3.2 oz (100.8 kg)   20 207 lb (93.9 kg)   19 216 lb (98 kg)     Temp Readings from Last 3 Encounters:   21 98.2 °F (36.8 °C) (Oral)   19 97.7 °F (36.5 °C)   19 98.1 °F (36.7 °C) (Oral)     BP Readings from Last 3 Encounters:   21 (!) 118/90   19 122/78   19 96/62     Pulse Readings from Last 3 Encounters:   21 (!) 58   19 77   19 80     Vitals:    21 1036   BP: (!) 118/90   Pulse: (!) 58   Resp: 14   Temp: 98.2 °F (36.8 °C)   TempSrc: Oral   SpO2: 99%   Weight: 222 lb 3.2 oz (100.8 kg)   Height: 5' 6\" (1.676 m)   PainSc:   0 - No pain   LMP: 07/10/2021         Learning Assessment:  :     No flowsheet data found.     Depression Screening:  :     3 most recent PHQ Screens 2021   Little interest or pleasure in doing things Not at all   Feeling down, depressed, irritable, or hopeless Not at all   Total Score PHQ 2 0       Fall Risk Assessment:  : Fall Risk Assessment, last 12 mths 11/5/2020   Able to walk? Yes   Fall in past 12 months? No       Abuse Screening:  :     Abuse Screening Questionnaire 11/5/2020 5/13/2020 11/21/2019 5/24/2019 4/10/2019 9/28/2017 6/1/2017   Do you ever feel afraid of your partner? N N N N N N N   Are you in a relationship with someone who physically or mentally threatens you? N N N N N N N   Is it safe for you to go home? Y Y Y Y Y Y Y       ADL Screening:  :     No flowsheet data found. Medication reconciliation up to date and corrected with patient at this time.

## 2021-08-04 NOTE — PROGRESS NOTES
Emma Wilson is a 46 y.o. female who presents today for Complete Physical (RM21//pt presents today for annual CPE; evaluate thyroid stopped thyroid medication 8 weeks ago, discuss weight loss options )  . She has a history of   Patient Active Problem List   Diagnosis Code    Acquired hypothyroidism E03.9    Obesity E66.9   . Today patient is here for complete physical exam..   she does have other concerns. Hypothyroidism: Has come off levothyroxine. Obesity: Patient's weight has trended back up over the last 6 months. She has previously benefited from courses of phentermine. Health maintenance hx includes:  Exercise: moderately active. Form of exercise: Walking   Diet: generally follows a low fat low cholesterol diet, nonsmoker, alcohol intake Socially. Social: Working in [employer]. 6 at home. 2 kids (13,9). Screening:    Breast cancer screening: N/A   Cervical cancer screening: last PAP/Pelvic exam: 2018   and was normal. OBGYN: Dr Castellanos [place]    Immunizations:     Immunization History   Administered Date(s) Administered    COVID-19, PFIZER, MRNA, LNP-S, PF, 30MCG/0.3ML DOSE 01/31/2021, 03/01/2021    Influenza Vaccine 09/25/2019, 10/16/2020    Influenza Vaccine (Quad) Mdck Pf (>4 Yrs Flucelvax QUAD [id number]) 10/31/2018    Tdap 05/24/2019      Immunization status: up to date and documented. ROS  Review of Systems   Constitutional: Negative for chills, fever and weight loss. Weight gain   HENT: Negative for congestion, ear discharge, ear pain, hearing loss, nosebleeds, sore throat and tinnitus. Eyes: Negative for blurred vision, double vision and photophobia. Respiratory: Negative for cough, hemoptysis and shortness of breath. Cardiovascular: Negative for chest pain, palpitations, orthopnea, claudication and leg swelling. Gastrointestinal: Negative for abdominal pain, constipation, diarrhea, heartburn, nausea and vomiting.    Genitourinary: Negative for dysuria, frequency, hematuria and urgency. Musculoskeletal: Negative for back pain, falls, joint pain, myalgias and neck pain. Skin: Negative for rash. Neurological: Negative. Negative for headaches. Endo/Heme/Allergies: Does not bruise/bleed easily. Psychiatric/Behavioral: Negative for depression, hallucinations, memory loss, substance abuse and suicidal ideas. The patient is not nervous/anxious. Visit Vitals  BP (!) 118/90 (BP 1 Location: Left upper arm, BP Patient Position: Sitting, BP Cuff Size: Large adult)   Pulse (!) 58   Temp 98.2 °F (36.8 °C) (Oral)   Resp 14   Ht 5' 6\" (1.676 m)   Wt 222 lb 3.2 oz (100.8 kg)   SpO2 99%   BMI 35.86 kg/m²       Physical Exam  Constitutional:       General: She is not in acute distress. Appearance: She is well-developed. HENT:      Head: Normocephalic and atraumatic. Right Ear: Tympanic membrane normal.      Left Ear: Tympanic membrane normal.      Nose: Nose normal.   Neck:      Thyroid: No thyromegaly. Cardiovascular:      Rate and Rhythm: Normal rate and regular rhythm. Heart sounds: No murmur heard. Pulmonary:      Effort: Pulmonary effort is normal. No respiratory distress. Breath sounds: Normal breath sounds. No wheezing. Abdominal:      General: Bowel sounds are normal. There is no distension. Palpations: Abdomen is soft. Musculoskeletal:      Cervical back: Normal range of motion and neck supple. Skin:     General: Skin is warm and dry. Neurological:      Mental Status: She is alert and oriented to person, place, and time. Cranial Nerves: No cranial nerve deficit. Psychiatric:         Behavior: Behavior normal.           Current Outpatient Medications   Medication Sig    multivitamin (ONE A DAY) tablet Take 1 Tab by mouth daily.  hydrocortisone-pramoxine (PROCTOFOAM HC) rectal foam Insert 1 Applicator into rectum two (2) times a day. No current facility-administered medications for this visit. History reviewed. No pertinent past medical history. History reviewed. No pertinent surgical history. Social History     Tobacco Use    Smoking status: Never Smoker    Smokeless tobacco: Never Used   Substance Use Topics    Alcohol use: Yes     Comment: 4-5 glasses per year      Family History   Problem Relation Age of Onset    Hypertension Mother     Lung Disease Mother         TB in '01    Elevated Lipids Father     Stroke Maternal Grandmother     Arthritis-osteo Maternal Grandfather     Cancer Maternal Grandfather 80        colon ca    Diabetes Maternal Grandfather     Elevated Lipids Maternal Grandfather         No Known Allergies     Assessment/Plan  Diagnoses and all orders for this visit:    1. Wellness examination -likely due for Pap. Felisa Ling was counseled on age-appropriate/ guideline-based risk prevention behaviors and screening for a 46y.o. year old   female . We also discussed adjustments in screening based on family history if necessary. Printed instructions for preventative screening guidelines and healthy behaviors given to patient with after visit summary.    -     METABOLIC PANEL, COMPREHENSIVE; Future  -     CBC WITH AUTOMATED DIFF; Future  -     LIPID PANEL; Future    2. Acquired hypothyroidism -if unable to maintain weight loss, may be a reason to resume levothyroxine. We will try phentermine first.  -     TSH 3RD GENERATION; Future  -     T4, FREE; Future    3. Class 2 obesity in adult, unspecified BMI, unspecified obesity type, unspecified whether serious comorbidity present-continue diet and exercise. Work on less snacking as she has been working from home. -     phentermine (ADIPEX-P) 37.5 mg tablet; Take 1 Tablet by mouth every morning. Max Daily Amount: 37.5 mg.            Cornelius Hall MD  8/4/2021    This note was created with the help of speech recognition software Tyrell Durant) and may contain some 'sound alike' errors.

## 2021-08-04 NOTE — PATIENT INSTRUCTIONS
Well Visit, Ages 25 to 48: Care Instructions  Overview     Well visits can help you stay healthy. Your doctor has checked your overall health and may have suggested ways to take good care of yourself. Your doctor also may have recommended tests. At home, you can help prevent illness with healthy eating, regular exercise, and other steps. Follow-up care is a key part of your treatment and safety. Be sure to make and go to all appointments, and call your doctor if you are having problems. It's also a good idea to know your test results and keep a list of the medicines you take. How can you care for yourself at home? · Get screening tests that you and your doctor decide on. Screening helps find diseases before any symptoms appear. · Eat healthy foods. Choose fruits, vegetables, whole grains, protein, and low-fat dairy foods. Limit fat, especially saturated fat. Reduce salt in your diet. · Limit alcohol. If you are a man, have no more than 2 drinks a day or 14 drinks a week. If you are a woman, have no more than 1 drink a day or 7 drinks a week. · Get at least 30 minutes of physical activity on most days of the week. Walking is a good choice. You also may want to do other activities, such as running, swimming, cycling, or playing tennis or team sports. Discuss any changes in your exercise program with your doctor. · Reach and stay at a healthy weight. This will lower your risk for many problems, such as obesity, diabetes, heart disease, and high blood pressure. · Do not smoke or allow others to smoke around you. If you need help quitting, talk to your doctor about stop-smoking programs and medicines. These can increase your chances of quitting for good. · Care for your mental health. It is easy to get weighed down by worry and stress. Learn strategies to manage stress, like deep breathing and mindfulness, and stay connected with your family and community.  If you find you often feel sad or hopeless, talk with your doctor. Treatment can help. · Talk to your doctor about whether you have any risk factors for sexually transmitted infections (STIs). You can help prevent STIs if you wait to have sex with a new partner (or partners) until you've each been tested for STIs. It also helps if you use condoms (male or female condoms) and if you limit your sex partners to one person who only has sex with you. Vaccines are available for some STIs, such as HPV. · Use birth control if it's important to you to prevent pregnancy. Talk with your doctor about the choices available and what might be best for you. · If you think you may have a problem with alcohol or drug use, talk to your doctor. This includes prescription medicines (such as amphetamines and opioids) and illegal drugs (such as cocaine and methamphetamine). Your doctor can help you figure out what type of treatment is best for you. · Protect your skin from too much sun. When you're outdoors from 10 a.m. to 4 p.m., stay in the shade or cover up with clothing and a hat with a wide brim. Wear sunglasses that block UV rays. Even when it's cloudy, put broad-spectrum sunscreen (SPF 30 or higher) on any exposed skin. · See a dentist one or two times a year for checkups and to have your teeth cleaned. · Wear a seat belt in the car. When should you call for help? Watch closely for changes in your health, and be sure to contact your doctor if you have any problems or symptoms that concern you. Where can you learn more? Go to http://www.[employer].com/  Enter P072 in the search box to learn more about \"Well Visit, Ages 25 to 48: Care Instructions. \"  Current as of: May 27, 2020               Content Version: 12.8  © 9051-6162 Healthwise, Incorporated. Care instructions adapted under license by [employer] (which disclaims liability or warranty for this information).  If you have questions about a medical condition or this instruction, always ask your healthcare professional. [place] [address] any warranty or liability for your use of this information.

## 2021-08-05 LAB
ALBUMIN SERPL-MCNC: 4 G/DL (ref 3.8–4.8)
ALBUMIN/GLOB SERPL: 1.5 {RATIO} (ref 1.2–2.2)
ALP SERPL-CCNC: 86 IU/L (ref 48–121)
ALT SERPL-CCNC: 18 IU/L (ref 0–32)
AST SERPL-CCNC: 20 IU/L (ref 0–40)
BASOPHILS # BLD AUTO: 0.1 X10E3/UL (ref 0–0.2)
BASOPHILS NFR BLD AUTO: 1 %
BILIRUB SERPL-MCNC: 0.5 MG/DL (ref 0–1.2)
BUN SERPL-MCNC: 11 MG/DL (ref 6–20)
BUN/CREAT SERPL: 13 (ref 9–23)
CALCIUM SERPL-MCNC: 8.8 MG/DL (ref 8.7–10.2)
CHLORIDE SERPL-SCNC: 102 MMOL/L (ref 96–106)
CHOLEST SERPL-MCNC: 198 MG/DL (ref 100–199)
CO2 SERPL-SCNC: 22 MMOL/L (ref 20–29)
CREAT SERPL-MCNC: 0.86 MG/DL (ref 0.57–1)
EOSINOPHIL # BLD AUTO: 0.1 X10E3/UL (ref 0–0.4)
EOSINOPHIL NFR BLD AUTO: 2 %
ERYTHROCYTE [DISTWIDTH] IN BLOOD BY AUTOMATED COUNT: 12.8 % (ref 11.7–15.4)
GLOBULIN SER CALC-MCNC: 2.7 G/DL (ref 1.5–4.5)
GLUCOSE SERPL-MCNC: 83 MG/DL (ref 65–99)
HCT VFR BLD AUTO: 39.2 % (ref 34–46.6)
HDLC SERPL-MCNC: 47 MG/DL
HGB BLD-MCNC: 12.6 G/DL (ref 11.1–15.9)
IMM GRANULOCYTES # BLD AUTO: 0 X10E3/UL (ref 0–0.1)
IMM GRANULOCYTES NFR BLD AUTO: 0 %
IMP & REVIEW OF LAB RESULTS: NORMAL
LDLC SERPL CALC-MCNC: 137 MG/DL (ref 0–99)
LYMPHOCYTES # BLD AUTO: 2.7 X10E3/UL (ref 0.7–3.1)
LYMPHOCYTES NFR BLD AUTO: 42 %
MCH RBC QN AUTO: 27.4 PG (ref 26.6–33)
MCHC RBC AUTO-ENTMCNC: 32.1 G/DL (ref 31.5–35.7)
MCV RBC AUTO: 85 FL (ref 79–97)
MONOCYTES # BLD AUTO: 0.4 X10E3/UL (ref 0.1–0.9)
MONOCYTES NFR BLD AUTO: 6 %
NEUTROPHILS # BLD AUTO: 3.1 X10E3/UL (ref 1.4–7)
NEUTROPHILS NFR BLD AUTO: 49 %
PLATELET # BLD AUTO: 352 X10E3/UL (ref 150–450)
POTASSIUM SERPL-SCNC: 4.1 MMOL/L (ref 3.5–5.2)
PROT SERPL-MCNC: 6.7 G/DL (ref 6–8.5)
RBC # BLD AUTO: 4.6 X10E6/UL (ref 3.77–5.28)
SODIUM SERPL-SCNC: 137 MMOL/L (ref 134–144)
T4 FREE SERPL-MCNC: 1.3 NG/DL (ref 0.82–1.77)
TRIGL SERPL-MCNC: 76 MG/DL (ref 0–149)
TSH SERPL DL<=0.005 MIU/L-ACNC: 3.67 UIU/ML (ref 0.45–4.5)
VLDLC SERPL CALC-MCNC: 14 MG/DL (ref 5–40)
WBC # BLD AUTO: 6.4 X10E3/UL (ref 3.4–10.8)

## 2022-03-17 ENCOUNTER — OFFICE VISIT (OUTPATIENT)
Dept: INTERNAL MEDICINE CLINIC | Age: 42
End: 2022-03-17

## 2022-03-17 VITALS
BODY MASS INDEX: 35.77 KG/M2 | TEMPERATURE: 98.1 F | RESPIRATION RATE: 18 BRPM | HEIGHT: 66 IN | DIASTOLIC BLOOD PRESSURE: 84 MMHG | HEART RATE: 70 BPM | WEIGHT: 222.6 LBS | OXYGEN SATURATION: 100 % | SYSTOLIC BLOOD PRESSURE: 122 MMHG

## 2022-03-17 DIAGNOSIS — E03.9 ACQUIRED HYPOTHYROIDISM: Primary | ICD-10-CM

## 2022-03-17 DIAGNOSIS — E66.9 CLASS 2 OBESITY IN ADULT, UNSPECIFIED BMI, UNSPECIFIED OBESITY TYPE, UNSPECIFIED WHETHER SERIOUS COMORBIDITY PRESENT: ICD-10-CM

## 2022-03-17 DIAGNOSIS — R53.83 FATIGUE, UNSPECIFIED TYPE: ICD-10-CM

## 2022-03-17 PROCEDURE — 99213 OFFICE O/P EST LOW 20 MIN: CPT | Performed by: INTERNAL MEDICINE

## 2022-03-17 NOTE — PROGRESS NOTES
Pacheco Rahman  Identified pt with two pt identifiers(name and ). Chief Complaint   Patient presents with    Follow-up     RM18// pt presents today with having some fatigue and lack of energy has had trouble manageing weight lately; having episodes of light headedness when standing for last two days, wants thyroid rechecked       1. Have you been to the ER, urgent care clinic since your last visit? Hospitalized since your last visit? NO    2. Have you seen or consulted any other health care providers outside of the [address] [place] [address] since your last visit? Include any pap smears or colon screening. NO      Provider notified of reason for visit, vitals and flowsheets obtained on patients. Patient received paperwork for advance directive during previous visit but has not completed at this time     Reviewed record In preparation for visit, huddled with provider and have obtained necessary documentation      Health Maintenance Due   Topic    Cervical cancer screen     Flu Vaccine (1)       Wt Readings from Last 3 Encounters:   22 222 lb 9.6 oz (101 kg)   21 222 lb 3.2 oz (100.8 kg)   20 207 lb (93.9 kg)     Temp Readings from Last 3 Encounters:   22 98.1 °F (36.7 °C) (Oral)   21 98.2 °F (36.8 °C) (Oral)   19 97.7 °F (36.5 °C)     BP Readings from Last 3 Encounters:   22 122/84   21 122/82   19 122/78     Pulse Readings from Last 3 Encounters:   22 70   21 (!) 58   19 77     Vitals:    22 1117   BP: 122/84   Pulse: 70   Resp: 18   Temp: 98.1 °F (36.7 °C)   TempSrc: Oral   SpO2: 100%   Weight: 222 lb 9.6 oz (101 kg)   Height: 5' 6\" (1.676 m)   PainSc:   0 - No pain   LMP: 2022         Learning Assessment:  :     No flowsheet data found.     Depression Screening:  :     3 most recent PHQ Screens 3/17/2022   Little interest or pleasure in doing things Not at all   Feeling down, depressed, irritable, or hopeless Not at all   Total Score PHQ 2 0       Fall Risk Assessment:  :     Fall Risk Assessment, last 12 mths 11/5/2020   Able to walk? Yes   Fall in past 12 months? No       Abuse Screening:  :     Abuse Screening Questionnaire 11/5/2020 5/13/2020 11/21/2019 5/24/2019 4/10/2019 9/28/2017 6/1/2017   Do you ever feel afraid of your partner? N N N N N N N   Are you in a relationship with someone who physically or mentally threatens you? N N N N N N N   Is it safe for you to go home? Y Y Y Y Y Y Y       ADL Screening:  :     No flowsheet data found. Medication reconciliation up to date and corrected with patient at this time.

## 2022-03-17 NOTE — PROGRESS NOTES
Angie Feng is a 38 y.o. female who presents today for Follow-up (RM18// pt presents today with having some fatigue and lack of energy has had trouble manageing weight lately; having episodes of light headedness when standing for last two days, wants thyroid rechecked)  . She has a history of   Patient Active Problem List   Diagnosis Code    Acquired hypothyroidism E03.9    Obesity E66.9   . Today patient is here for f/u. Hypothyroidism: off replacement. Now with lethargy. Sleep is ok. Did have a little bit of dizziness which was prominent when she first was diagnosed hypothyroid. Weight has trended back up again. Overall mood is stable. She continues to be quite physically active. ROS  Review of Systems   Constitutional: Positive for malaise/fatigue. Negative for chills, fever and weight loss. HENT: Negative for congestion and sore throat. Eyes: Negative for blurred vision, double vision and photophobia. Respiratory: Negative for cough and shortness of breath. Cardiovascular: Negative for chest pain, palpitations and leg swelling. Gastrointestinal: Negative for abdominal pain, constipation, diarrhea, heartburn, nausea and vomiting. Genitourinary: Negative for dysuria, frequency and urgency. Musculoskeletal: Negative for joint pain and myalgias. Skin: Negative for rash. Neurological: Positive for dizziness (yesterday). Negative for tremors, speech change, focal weakness, seizures, loss of consciousness and headaches. Endo/Heme/Allergies: Does not bruise/bleed easily. Psychiatric/Behavioral: Negative for memory loss and suicidal ideas. Visit Vitals  /84 (BP 1 Location: Left upper arm, BP Patient Position: Sitting, BP Cuff Size: Large adult)   Pulse 70   Temp 98.1 °F (36.7 °C) (Oral)   Resp 18   Ht 5' 6\" (1.676 m)   Wt 222 lb 9.6 oz (101 kg)   SpO2 100%   BMI 35.93 kg/m²       Physical Exam  Constitutional:       Appearance: She is well-developed.    HENT: Head: Normocephalic and atraumatic. Cardiovascular:      Rate and Rhythm: Normal rate and regular rhythm. Heart sounds: No murmur heard. Pulmonary:      Effort: Pulmonary effort is normal. No respiratory distress. Skin:     General: Skin is warm and dry. Neurological:      Mental Status: She is alert and oriented to person, place, and time. Psychiatric:         Behavior: Behavior normal.           Current Outpatient Medications   Medication Sig    phentermine (ADIPEX-P) 37.5 mg tablet Take 1 Tablet by mouth every morning. Max Daily Amount: 37.5 mg.    multivitamin (ONE A DAY) tablet Take 1 Tab by mouth daily.  hydrocortisone-pramoxine (PROCTOFOAM HC) rectal foam Insert 1 Applicator into rectum two (2) times a day. No current facility-administered medications for this visit. No past medical history on file. No past surgical history on file. Social History     Tobacco Use    Smoking status: Never Smoker    Smokeless tobacco: Never Used   Substance Use Topics    Alcohol use: Yes     Comment: 4-5 glasses per year      Family History   Problem Relation Age of Onset    Hypertension Mother     Lung Disease Mother         TB in '01    Elevated Lipids Father     Stroke Maternal Grandmother     OSTEOARTHRITIS Maternal Grandfather     Cancer Maternal Grandfather 80        colon ca    Diabetes Maternal Grandfather     Elevated Lipids Maternal Grandfather         No Known Allergies     Assessment/Plan  Diagnoses and all orders for this visit:    1. Acquired hypothyroidism-experiencing more symptoms of hypothyroidism. Low threshold to resume levothyroxine if TSH is elevated. -     TSH 3RD GENERATION; Future  -     T4, FREE; Future    2. Class 2 obesity in adult, unspecified BMI, unspecified obesity type, unspecified whether serious comorbidity present-has regained some weight.   If TSH is normal, consider another round of phentermine.  -     CBC WITH AUTOMATED DIFF; Future    3. Fatigue, unspecified type  -     CBC WITH AUTOMATED DIFF; Future            Fior Duenas MD  3/17/2022    This note was created with the help of speech recognition software Brittni Hendrix) and may contain some 'sound alike' errors.

## 2022-03-18 LAB
BASOPHILS # BLD AUTO: 0.1 X10E3/UL (ref 0–0.2)
BASOPHILS NFR BLD AUTO: 1 %
EOSINOPHIL # BLD AUTO: 0.1 X10E3/UL (ref 0–0.4)
EOSINOPHIL NFR BLD AUTO: 1 %
ERYTHROCYTE [DISTWIDTH] IN BLOOD BY AUTOMATED COUNT: 12.1 % (ref 11.7–15.4)
HCT VFR BLD AUTO: 39.6 % (ref 34–46.6)
HGB BLD-MCNC: 12.9 G/DL (ref 11.1–15.9)
IMM GRANULOCYTES # BLD AUTO: 0 X10E3/UL (ref 0–0.1)
IMM GRANULOCYTES NFR BLD AUTO: 0 %
LYMPHOCYTES # BLD AUTO: 2.5 X10E3/UL (ref 0.7–3.1)
LYMPHOCYTES NFR BLD AUTO: 40 %
MCH RBC QN AUTO: 27.6 PG (ref 26.6–33)
MCHC RBC AUTO-ENTMCNC: 32.6 G/DL (ref 31.5–35.7)
MCV RBC AUTO: 85 FL (ref 79–97)
MONOCYTES # BLD AUTO: 0.4 X10E3/UL (ref 0.1–0.9)
MONOCYTES NFR BLD AUTO: 7 %
NEUTROPHILS # BLD AUTO: 3.3 X10E3/UL (ref 1.4–7)
NEUTROPHILS NFR BLD AUTO: 51 %
PLATELET # BLD AUTO: 362 X10E3/UL (ref 150–450)
RBC # BLD AUTO: 4.68 X10E6/UL (ref 3.77–5.28)
T4 FREE SERPL-MCNC: 1.22 NG/DL (ref 0.82–1.77)
TSH SERPL DL<=0.005 MIU/L-ACNC: 2.99 UIU/ML (ref 0.45–4.5)
WBC # BLD AUTO: 6.3 X10E3/UL (ref 3.4–10.8)

## 2022-03-19 PROBLEM — E66.9 OBESITY: Status: ACTIVE | Noted: 2017-09-28

## 2022-03-19 PROBLEM — E03.9 ACQUIRED HYPOTHYROIDISM: Status: ACTIVE | Noted: 2017-09-28

## 2022-03-25 DIAGNOSIS — E66.9 CLASS 2 OBESITY IN ADULT, UNSPECIFIED BMI, UNSPECIFIED OBESITY TYPE, UNSPECIFIED WHETHER SERIOUS COMORBIDITY PRESENT: Primary | ICD-10-CM

## 2022-03-25 RX ORDER — PHENTERMINE HYDROCHLORIDE 37.5 MG/1
37.5 TABLET ORAL
Qty: 30 TABLET | Refills: 3 | Status: SHIPPED | OUTPATIENT
Start: 2022-03-25 | End: 2022-09-08 | Stop reason: ALTCHOICE

## 2022-08-28 NOTE — PROGRESS NOTES
Nash Moscoso is a 38 y.o. female who presents today for Physical (RM18// pt presenting today for annual CPE; concerned with a pain that it on the (L) knee when bending back.)  . She has a history of   Patient Active Problem List   Diagnosis Code    Acquired hypothyroidism E03.9    Obesity E66.9   . Today patient is here for CPE. she does not have other concerns. Hypothyroidism: off levothyroxine. Discussed subclinical hypothyroidism and only treating if she feels better with levothyroxine. Mild HLD: noted in the past. Will repeat this year. Initial blood pressure elevated, normal on repeat. Does have a family history of hypertension, but no personal history    Has done a repeat course of phentermine. Last filled 6/2/2022. Weight has been variable. Has joined a gym and does have a . Plans on working through the gym on weight loss. I agree with this approach. Health maintenance hx includes:  Exercise: moderately active. Form of exercise: Walking   Diet: generally follows a low fat low cholesterol diet, nonsmoker, alcohol intake Socially. Social: Not working. Has 2 kids. Screening:    Colon cancer screening:N/A   Breast cancer screening: DUE. Cervical cancer screening: last PAP/Pelvic exam: about 3 yrs ago. OBGYN: Dr Lou Priest   Osteoporosis screening:  N/A      Immunizations:     Immunization History   Administered Date(s) Administered    COVID-19, PFIZER PURPLE top, DILUTE for use, (age 15 y+), IM, 30mcg/0.3mL 01/31/2021, 03/01/2021, 02/04/2022    Influenza Vaccine 09/25/2019, 10/16/2020    Influenza, FLUCELVAX, (age 10 mo+), MDCK, PF 10/31/2018    Tdap 05/24/2019      Immunization status: up to date and documented. ROS  Review of Systems   Constitutional:  Negative for chills, fever and weight loss. HENT:  Negative for congestion and sore throat. Eyes:  Negative for blurred vision, double vision, photophobia and pain.    Respiratory:  Negative for cough and shortness of breath. Cardiovascular:  Negative for chest pain, palpitations, orthopnea and leg swelling. Gastrointestinal:  Negative for abdominal pain, constipation, diarrhea, heartburn, nausea and vomiting. Genitourinary:  Negative for dysuria, frequency and urgency. Musculoskeletal:  Negative for back pain, falls, joint pain, myalgias and neck pain. Skin:  Negative for rash. Neurological: Negative. Negative for headaches. Endo/Heme/Allergies:  Does not bruise/bleed easily. Psychiatric/Behavioral:  Negative for depression, memory loss and suicidal ideas. The patient is not nervous/anxious. Visit Vitals  /82   Pulse 67   Temp 98.5 °F (36.9 °C) (Oral)   Resp 16   Ht 5' 6\" (1.676 m)   Wt 225 lb 6.4 oz (102.2 kg)   SpO2 98%   BMI 36.38 kg/m²       Physical Exam  Constitutional:       General: She is not in acute distress. Appearance: She is well-developed. HENT:      Head: Normocephalic and atraumatic. Right Ear: Tympanic membrane normal.      Left Ear: Tympanic membrane normal.      Nose: Nose normal.   Neck:      Thyroid: No thyromegaly. Cardiovascular:      Rate and Rhythm: Normal rate and regular rhythm. Heart sounds: No murmur heard. Pulmonary:      Effort: Pulmonary effort is normal.      Breath sounds: Normal breath sounds. No wheezing. Abdominal:      General: Bowel sounds are normal. There is no distension. Palpations: Abdomen is soft. Musculoskeletal:      Cervical back: Normal range of motion and neck supple. Skin:     General: Skin is warm and dry. Neurological:      Mental Status: She is alert and oriented to person, place, and time. Cranial Nerves: No cranial nerve deficit. Psychiatric:         Behavior: Behavior normal.         Current Outpatient Medications   Medication Sig    multivitamin (ONE A DAY) tablet Take 1 Tab by mouth daily. phentermine (ADIPEX-P) 37.5 mg tablet Take 1 Tablet by mouth every morning.  Max Daily Amount: 37.5 mg. (Patient not taking: Reported on 8/29/2022)    phentermine (ADIPEX-P) 37.5 mg tablet Take 1 Tablet by mouth every morning. Max Daily Amount: 37.5 mg. (Patient not taking: Reported on 8/29/2022)    hydrocortisone-pramoxine (PROCTOFOAM HC) rectal foam Insert 1 Applicator into rectum two (2) times a day. (Patient not taking: Reported on 8/29/2022)     No current facility-administered medications for this visit. History reviewed. No pertinent past medical history. History reviewed. No pertinent surgical history. Social History     Tobacco Use    Smoking status: Never    Smokeless tobacco: Never   Substance Use Topics    Alcohol use: Yes     Alcohol/week: 1.0 standard drink     Types: 1 Glasses of wine per week     Comment: 4-5 glasses per year      Family History   Problem Relation Age of Onset    Hypertension Mother     Lung Disease Mother         TB in '01    Elevated Lipids Father     Stroke Maternal Grandmother     OSTEOARTHRITIS Maternal Grandfather     Cancer Maternal Grandfather 80        colon ca    Diabetes Maternal Grandfather     Elevated Lipids Maternal Grandfather         No Known Allergies     Assessment/Plan  Diagnoses and all orders for this visit:    1. Wellness examination- Due for RG Pop was counseled on age-appropriate/ guideline-based risk prevention behaviors and screening for a 38y.o. year old   female . We also discussed adjustments in screening based on family history if necessary. Printed instructions for preventative screening guidelines and healthy behaviors given to patient with after visit summary. e  -     LIPID PANEL; Future  -     TSH 3RD GENERATION; Future  -     T4, FREE; Future  -     METABOLIC PANEL, COMPREHENSIVE; Future  -     CBC WITH AUTOMATED DIFF; Future    2. Acquired hypothyroidism-subclinical hypothyroidism. Currently off therapy  -     TSH 3RD GENERATION; Future  -     T4, FREE; Future    3.  Class 2 obesity in adult, unspecified BMI, unspecified obesity type, unspecified whether serious comorbidity present-agree with approach of increased physical activity, continue healthy lifestyle. 4. Hyperlipidemia, unspecified hyperlipidemia type- mild in the past.   -     LIPID PANEL; Future  -     METABOLIC PANEL, COMPREHENSIVE; Future  -     CBC WITH AUTOMATED DIFF; Future    5. Encounter for screening for malignant neoplasm of breast, unspecified screening modality  -     [facility] MAMMO BI SCREENING INCL CAD; Future    6. Vitamin D deficiency  -     VITAMIN D, 25 HYDROXY; Future          Agueda Trent MD  8/29/2022    This note was created with the help of speech recognition software Marah Ranger) and may contain some 'sound alike' errors.

## 2022-08-29 ENCOUNTER — OFFICE VISIT (OUTPATIENT)
Dept: INTERNAL MEDICINE CLINIC | Age: 42
End: 2022-08-29

## 2022-08-29 VITALS
HEIGHT: 66 IN | BODY MASS INDEX: 36.22 KG/M2 | RESPIRATION RATE: 16 BRPM | TEMPERATURE: 98.5 F | HEART RATE: 67 BPM | OXYGEN SATURATION: 98 % | SYSTOLIC BLOOD PRESSURE: 128 MMHG | WEIGHT: 225.4 LBS | DIASTOLIC BLOOD PRESSURE: 82 MMHG

## 2022-08-29 DIAGNOSIS — Z12.39 ENCOUNTER FOR SCREENING FOR MALIGNANT NEOPLASM OF BREAST, UNSPECIFIED SCREENING MODALITY: ICD-10-CM

## 2022-08-29 DIAGNOSIS — E55.9 VITAMIN D DEFICIENCY: ICD-10-CM

## 2022-08-29 DIAGNOSIS — E03.9 ACQUIRED HYPOTHYROIDISM: ICD-10-CM

## 2022-08-29 DIAGNOSIS — E78.5 HYPERLIPIDEMIA, UNSPECIFIED HYPERLIPIDEMIA TYPE: ICD-10-CM

## 2022-08-29 DIAGNOSIS — Z00.00 WELLNESS EXAMINATION: Primary | ICD-10-CM

## 2022-08-29 DIAGNOSIS — E66.9 CLASS 2 OBESITY IN ADULT, UNSPECIFIED BMI, UNSPECIFIED OBESITY TYPE, UNSPECIFIED WHETHER SERIOUS COMORBIDITY PRESENT: ICD-10-CM

## 2022-08-29 PROCEDURE — 99396 PREV VISIT EST AGE 40-64: CPT | Performed by: INTERNAL MEDICINE

## 2022-08-29 NOTE — PROGRESS NOTES
Jerson Verito  Identified pt with two pt identifiers(name and ). Chief Complaint   Patient presents with    Physical     RM18// pt presenting today for annual CPE; concerned with a pain that it on the (L) knee when bending back. 1. Have you been to the ER, urgent care clinic since your last visit? Hospitalized since your last visit? NO    2. Have you seen or consulted any other health care providers outside of the [address] [place] [address] since your last visit? Include any pap smears or colon screening. NO      Provider notified of reason for visit, vitals and flowsheets obtained on patients. Patient received paperwork for advance directive during previous visit but has not completed at this time     Reviewed record In preparation for visit, huddled with provider and have obtained necessary documentation      Health Maintenance Due   Topic    Cervical cancer screen        Wt Readings from Last 3 Encounters:   22 225 lb 6.4 oz (102.2 kg)   22 222 lb 9.6 oz (101 kg)   21 222 lb 3.2 oz (100.8 kg)     Temp Readings from Last 3 Encounters:   22 98.5 °F (36.9 °C) (Oral)   22 98.1 °F (36.7 °C) (Oral)   21 98.2 °F (36.8 °C) (Oral)     BP Readings from Last 3 Encounters:   22 (!) 147/91   22 122/84   21 122/82     Pulse Readings from Last 3 Encounters:   22 67   22 70   21 (!) 58     Vitals:    22 1320   BP: (!) 147/91   Pulse: 67   Resp: 16   Temp: 98.5 °F (36.9 °C)   TempSrc: Oral   SpO2: 98%   Weight: 225 lb 6.4 oz (102.2 kg)   Height: 5' 6\" (1.676 m)   PainSc:   7   PainLoc: Knee         Learning Assessment:  :     No flowsheet data found.     Depression Screening:  :     3 most recent PHQ Screens 2022   Little interest or pleasure in doing things Not at all   Feeling down, depressed, irritable, or hopeless Not at all   Total Score PHQ 2 0       Fall Risk Assessment:  :     Fall Risk Assessment, last 12 mths 2020 Able to walk? Yes   Fall in past 12 months? No       Abuse Screening:  :     Abuse Screening Questionnaire 11/5/2020 5/13/2020 11/21/2019 5/24/2019 4/10/2019 9/28/2017 6/1/2017   Do you ever feel afraid of your partner? N N N N N N N   Are you in a relationship with someone who physically or mentally threatens you? N N N N N N N   Is it safe for you to go home? Y Y Y Y Y Y Y       ADL Screening:  :     No flowsheet data found. Medication reconciliation up to date and corrected with patient at this time.

## 2022-08-29 NOTE — Clinical Note
Can you reach out to her, as her insurance of that we were not working in the  told her that she was not in network.

## 2022-08-31 LAB
25(OH)D3+25(OH)D2 SERPL-MCNC: 9.7 NG/ML (ref 30–100)
ALBUMIN SERPL-MCNC: 4.2 G/DL (ref 3.8–4.8)
ALBUMIN/GLOB SERPL: 1.5 {RATIO} (ref 1.2–2.2)
ALP SERPL-CCNC: 85 IU/L (ref 44–121)
ALT SERPL-CCNC: 20 IU/L (ref 0–32)
AST SERPL-CCNC: 17 IU/L (ref 0–40)
BASOPHILS # BLD AUTO: 0.1 X10E3/UL (ref 0–0.2)
BASOPHILS NFR BLD AUTO: 1 %
BILIRUB SERPL-MCNC: 0.4 MG/DL (ref 0–1.2)
BUN SERPL-MCNC: 8 MG/DL (ref 6–24)
BUN/CREAT SERPL: 10 (ref 9–23)
CALCIUM SERPL-MCNC: 9.1 MG/DL (ref 8.7–10.2)
CHLORIDE SERPL-SCNC: 100 MMOL/L (ref 96–106)
CHOLEST SERPL-MCNC: 205 MG/DL (ref 100–199)
CO2 SERPL-SCNC: 24 MMOL/L (ref 20–29)
CREAT SERPL-MCNC: 0.83 MG/DL (ref 0.57–1)
EGFR: 91 ML/MIN/1.73
EOSINOPHIL # BLD AUTO: 0.1 X10E3/UL (ref 0–0.4)
EOSINOPHIL NFR BLD AUTO: 2 %
ERYTHROCYTE [DISTWIDTH] IN BLOOD BY AUTOMATED COUNT: 12.5 % (ref 11.7–15.4)
GLOBULIN SER CALC-MCNC: 2.8 G/DL (ref 1.5–4.5)
GLUCOSE SERPL-MCNC: 92 MG/DL (ref 65–99)
HCT VFR BLD AUTO: 40.2 % (ref 34–46.6)
HDLC SERPL-MCNC: 50 MG/DL
HGB BLD-MCNC: 12.7 G/DL (ref 11.1–15.9)
IMM GRANULOCYTES # BLD AUTO: 0 X10E3/UL (ref 0–0.1)
IMM GRANULOCYTES NFR BLD AUTO: 0 %
IMP & REVIEW OF LAB RESULTS: NORMAL
LDLC SERPL CALC-MCNC: 139 MG/DL (ref 0–99)
LYMPHOCYTES # BLD AUTO: 2.1 X10E3/UL (ref 0.7–3.1)
LYMPHOCYTES NFR BLD AUTO: 38 %
MCH RBC QN AUTO: 27.1 PG (ref 26.6–33)
MCHC RBC AUTO-ENTMCNC: 31.6 G/DL (ref 31.5–35.7)
MCV RBC AUTO: 86 FL (ref 79–97)
MONOCYTES # BLD AUTO: 0.3 X10E3/UL (ref 0.1–0.9)
MONOCYTES NFR BLD AUTO: 5 %
NEUTROPHILS # BLD AUTO: 3 X10E3/UL (ref 1.4–7)
NEUTROPHILS NFR BLD AUTO: 54 %
PLATELET # BLD AUTO: 363 X10E3/UL (ref 150–450)
POTASSIUM SERPL-SCNC: 4.6 MMOL/L (ref 3.5–5.2)
PROT SERPL-MCNC: 7 G/DL (ref 6–8.5)
RBC # BLD AUTO: 4.68 X10E6/UL (ref 3.77–5.28)
SODIUM SERPL-SCNC: 137 MMOL/L (ref 134–144)
T4 FREE SERPL-MCNC: 1.17 NG/DL (ref 0.82–1.77)
TRIGL SERPL-MCNC: 86 MG/DL (ref 0–149)
TSH SERPL DL<=0.005 MIU/L-ACNC: 3.28 UIU/ML (ref 0.45–4.5)
VLDLC SERPL CALC-MCNC: 16 MG/DL (ref 5–40)
WBC # BLD AUTO: 5.6 X10E3/UL (ref 3.4–10.8)

## 2022-09-08 RX ORDER — ERGOCALCIFEROL 1.25 MG/1
50000 CAPSULE ORAL
Qty: 12 CAPSULE | Refills: 3 | Status: SHIPPED | OUTPATIENT
Start: 2022-09-08

## 2022-09-08 NOTE — PROGRESS NOTES
Patient asked about bakers cyst and an anti inflammatory? Patient also agrees to Vit. D script to [facility] on file. Patient grateful for call and voiced understanding of lab results.

## 2023-02-08 ENCOUNTER — OFFICE VISIT (OUTPATIENT)
Dept: INTERNAL MEDICINE CLINIC | Age: 43
End: 2023-02-08

## 2023-02-08 VITALS
TEMPERATURE: 98.2 F | WEIGHT: 227.6 LBS | HEIGHT: 66 IN | SYSTOLIC BLOOD PRESSURE: 121 MMHG | BODY MASS INDEX: 36.58 KG/M2 | DIASTOLIC BLOOD PRESSURE: 87 MMHG | HEART RATE: 82 BPM | OXYGEN SATURATION: 100 % | RESPIRATION RATE: 16 BRPM

## 2023-02-08 DIAGNOSIS — M25.562 CHRONIC PAIN OF LEFT KNEE: Primary | ICD-10-CM

## 2023-02-08 DIAGNOSIS — E03.9 ACQUIRED HYPOTHYROIDISM: ICD-10-CM

## 2023-02-08 DIAGNOSIS — G89.29 CHRONIC PAIN OF LEFT KNEE: Primary | ICD-10-CM

## 2023-02-08 DIAGNOSIS — E78.5 HYPERLIPIDEMIA, UNSPECIFIED HYPERLIPIDEMIA TYPE: ICD-10-CM

## 2023-02-08 DIAGNOSIS — E55.9 VITAMIN D DEFICIENCY: ICD-10-CM

## 2023-02-08 PROCEDURE — 99214 OFFICE O/P EST MOD 30 MIN: CPT | Performed by: INTERNAL MEDICINE

## 2023-02-08 NOTE — PROGRESS NOTES
Edwin Bird is a 41 y.o. female who presents today for Follow-up (RM19// pt presenting today for 3 month Vitamin D follow-up.)  . She has a history of   Patient Active Problem List   Diagnosis Code    Acquired hypothyroidism E03.9    Obesity E66.9   . Today patient is here for follow-up.   she does not have other concerns. Vitamin D deficiency: Patient has been on high-dose replacement. We will repeat her levels today. On 1000IU of D3.     Clinical hypothyroidism: Patient overall feels well physically. Weight is stable. Planning on having a tummy tuck in the spring. HLD: repeat as diet has been better. Continues to have Lateral/posterior L knee pain. No swelling. This is somewhat physically limiting to her. Denies any previous injury. ROS  Review of Systems   Constitutional:  Negative for chills, fever and weight loss. HENT:  Negative for congestion and sore throat. Eyes:  Negative for blurred vision, double vision and photophobia. Respiratory:  Negative for cough and shortness of breath. Cardiovascular:  Negative for chest pain, palpitations and leg swelling. Gastrointestinal:  Negative for abdominal pain, constipation, diarrhea, heartburn, nausea and vomiting. Genitourinary:  Negative for dysuria, frequency and urgency. Musculoskeletal:  Positive for joint pain. Negative for myalgias. Skin:  Negative for rash. Neurological: Negative. Negative for headaches. Endo/Heme/Allergies:  Does not bruise/bleed easily. Psychiatric/Behavioral:  Negative for memory loss and suicidal ideas. Visit Vitals  /87 (BP 1 Location: Left upper arm, BP Patient Position: Sitting, BP Cuff Size: Large adult)   Pulse 82   Temp 98.2 °F (36.8 °C) (Oral)   Resp 16   Ht 5' 6\" (1.676 m)   Wt 227 lb 9.6 oz (103.2 kg)   SpO2 100%   BMI 36.74 kg/m²       Physical Exam  Constitutional:       Appearance: She is well-developed. HENT:      Head: Normocephalic and atraumatic. Cardiovascular:      Rate and Rhythm: Normal rate and regular rhythm. Heart sounds: No murmur heard. Pulmonary:      Effort: Pulmonary effort is normal. No respiratory distress. Musculoskeletal:      Comments: No crepitus noted on knee exam.  Discomfort noted to posterior aspect of knee. Skin:     General: Skin is warm and dry. Neurological:      Mental Status: She is alert and oriented to person, place, and time. Psychiatric:         Behavior: Behavior normal.         Current Outpatient Medications   Medication Sig    ergocalciferol (ERGOCALCIFEROL) 1,250 mcg (50,000 unit) capsule Take 1 Capsule by mouth every seven (7) days. multivitamin (ONE A DAY) tablet Take 1 Tab by mouth daily. No current facility-administered medications for this visit. History reviewed. No pertinent past medical history. History reviewed. No pertinent surgical history. Social History     Tobacco Use    Smoking status: Never    Smokeless tobacco: Never   Substance Use Topics    Alcohol use: Yes     Alcohol/week: 1.0 standard drink     Types: 1 Glasses of wine per week     Comment: 4-5 glasses per year      Family History   Problem Relation Age of Onset    Hypertension Mother     Lung Disease Mother         TB in '01    Elevated Lipids Father     Stroke Maternal Grandmother     OSTEOARTHRITIS Maternal Grandfather     Cancer Maternal Grandfather 80        colon ca    Diabetes Maternal Grandfather     Elevated Lipids Maternal Grandfather         No Known Allergies     Assessment/Plan  Diagnoses and all orders for this visit:    1. Chronic pain of left knee-starting to limit her physically. Suggest seeing physical therapist.  No significant abnormality on exam  -     REFERRAL TO PHYSICAL THERAPY    2. Vitamin D deficiency-recheck levels. Patient currently taking 1000 IU of vitamin D3.  -     VITAMIN D, 25 HYDROXY; Future    3. Acquired hypothyroidism-subclinical, feeling well.   -     METABOLIC PANEL, BASIC; Future  -     TSH 3RD GENERATION; Future    4. Hyperlipidemia, unspecified hyperlipidemia type-has made some dietary changes  -     LIPID PANEL; Future          Cece Childs MD  2/8/2023    This note was created with the help of speech recognition software Timothy Harmon) and may contain some 'sound alike' errors.

## 2023-02-08 NOTE — PROGRESS NOTES
Loly Guerra  Identified pt with two pt identifiers(name and ). Chief Complaint   Patient presents with    Follow-up     RM19// pt presenting today for 3 month Vitamin D follow-up. 1. Have you been to the ER, urgent care clinic since your last visit? Hospitalized since your last visit? NO    2. Have you seen or consulted any other health care providers outside of the [address] [place] [address] since your last visit? Include any pap smears or colon screening. NO      Provider notified of reason for visit, vitals and flowsheets obtained on patients. Patient received paperwork for advance directive during previous visit but has not completed at this time     Reviewed record In preparation for visit, huddled with provider and have obtained necessary documentation      Health Maintenance Due   Topic    Cervical cancer screen     COVID-19 Vaccine (4 - Booster for Pfizer series)    Flu Vaccine (1)       Wt Readings from Last 3 Encounters:   23 227 lb 9.6 oz (103.2 kg)   22 225 lb 6.4 oz (102.2 kg)   22 222 lb 9.6 oz (101 kg)     Temp Readings from Last 3 Encounters:   23 98.2 °F (36.8 °C) (Oral)   22 98.5 °F (36.9 °C) (Oral)   22 98.1 °F (36.7 °C) (Oral)     BP Readings from Last 3 Encounters:   23 121/87   22 128/82   22 122/84     Pulse Readings from Last 3 Encounters:   23 82   22 67   22 70     Vitals:    23 1016   BP: 121/87   Pulse: 82   Resp: 16   Temp: 98.2 °F (36.8 °C)   TempSrc: Oral   SpO2: 100%   Weight: 227 lb 9.6 oz (103.2 kg)   Height: 5' 6\" (1.676 m)   PainSc:   3   PainLoc: Knee   LMP: 2023         Learning Assessment:  :     No flowsheet data found.     Depression Screening:  :     3 most recent PHQ Screens 2023   Little interest or pleasure in doing things Not at all   Feeling down, depressed, irritable, or hopeless Not at all   Total Score PHQ 2 0       Fall Risk Assessment:  :     Fall Risk Assessment, last 12 mths 11/5/2020   Able to walk? Yes   Fall in past 12 months? No       Abuse Screening:  :     Abuse Screening Questionnaire 11/5/2020 5/13/2020 11/21/2019 5/24/2019 4/10/2019 9/28/2017 6/1/2017   Do you ever feel afraid of your partner? N N N N N N N   Are you in a relationship with someone who physically or mentally threatens you? N N N N N N N   Is it safe for you to go home? Y Y Y Y Y Y Y       ADL Screening:  :     No flowsheet data found. Medication reconciliation up to date and corrected with patient at this time.

## 2023-02-10 LAB
25(OH)D3+25(OH)D2 SERPL-MCNC: 42.5 NG/ML (ref 30–100)
BUN SERPL-MCNC: 9 MG/DL (ref 6–24)
BUN/CREAT SERPL: 10 (ref 9–23)
CALCIUM SERPL-MCNC: 9.2 MG/DL (ref 8.7–10.2)
CHLORIDE SERPL-SCNC: 102 MMOL/L (ref 96–106)
CHOLEST SERPL-MCNC: 195 MG/DL (ref 100–199)
CO2 SERPL-SCNC: 24 MMOL/L (ref 20–29)
CREAT SERPL-MCNC: 0.94 MG/DL (ref 0.57–1)
EGFRCR SERPLBLD CKD-EPI 2021: 78 ML/MIN/1.73
GLUCOSE SERPL-MCNC: 93 MG/DL (ref 70–99)
HDLC SERPL-MCNC: 52 MG/DL
IMP & REVIEW OF LAB RESULTS: NORMAL
LDLC SERPL CALC-MCNC: 126 MG/DL (ref 0–99)
POTASSIUM SERPL-SCNC: 5 MMOL/L (ref 3.5–5.2)
SODIUM SERPL-SCNC: 140 MMOL/L (ref 134–144)
TRIGL SERPL-MCNC: 95 MG/DL (ref 0–149)
TSH SERPL DL<=0.005 MIU/L-ACNC: 3.09 UIU/ML (ref 0.45–4.5)
VLDLC SERPL CALC-MCNC: 17 MG/DL (ref 5–40)

## 2023-10-05 NOTE — PROGRESS NOTES
Poli Jacobs is a 41 y.o. female who presents today for Annual Exam and Lab Collection (Fasting - A1c, Vitamin D and thyroid.)  . She has a history of   Patient Active Problem List   Diagnosis    Obesity    Acquired hypothyroidism   . Today patient is here for CPE. she does not have other concerns. Hypothyroidism: off therapy for some time. Overall feeling well. Knee pain: noted to be worsening in Feb, sent to PT. Since still a problem, pain with overuse. Has not yet gotten in with PT. Will do so soon. Recent trip to [facility] aggravated her knee. Weight has been heading in the right direction. She is down to 204 pounds. Blood pressure looks great. Did have a tummy tuck. Having issues with rectal pain. Some bleeding. Hard stools at times. History of fissure. We discussed working on reducing constipation or hard stools with Metamucil. Health maintenance hx includes:  Exercise: moderately active. Form of exercise: walking   Diet: generally follows a low fat low cholesterol diet. 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 Screening:    Colon cancer screening:  NA   Breast cancer screening: last mammogram 2022 and   was abnormal, but reports that she did have a repeat test that was normal.  I do not have these in the records. Cervical cancer screening: last PAP/Pelvic exam: [facility] OBGYN: Dr Viktor Castro   Osteoporosis screening: NA      Immunizations:     Immunization History   Administered Date(s) Administered    COVID-19, PFIZER PURPLE top, DILUTE for use, (age 15 y+), 30mcg/0.3mL 01/31/2021, 03/01/2021, 02/04/2022    Influenza Virus Vaccine 09/25/2019, 10/16/2020    Influenza, FLUCELVAX, (age 10 mo+), MDCK, PF, 0.5mL 10/31/2018, 10/06/2023    TDaP, ADACEL (age 6y-58y), [facility] (age 10y+), IM, 0.5mL 05/24/2019      Immunization status: up to date and documented. ROS  Review of Systems   Constitutional:  Negative for fatigue and fever. HENT:  Negative for congestion and ear pain.

## 2023-10-06 ENCOUNTER — OFFICE VISIT (OUTPATIENT)
Age: 43
End: 2023-10-06
Payer: COMMERCIAL

## 2023-10-06 VITALS
TEMPERATURE: 98.3 F | OXYGEN SATURATION: 98 % | HEIGHT: 66 IN | SYSTOLIC BLOOD PRESSURE: 121 MMHG | BODY MASS INDEX: 32.82 KG/M2 | WEIGHT: 204.2 LBS | RESPIRATION RATE: 20 BRPM | HEART RATE: 67 BPM | DIASTOLIC BLOOD PRESSURE: 86 MMHG

## 2023-10-06 DIAGNOSIS — E03.9 HYPOTHYROIDISM, UNSPECIFIED TYPE: ICD-10-CM

## 2023-10-06 DIAGNOSIS — E78.5 HYPERLIPIDEMIA, UNSPECIFIED HYPERLIPIDEMIA TYPE: ICD-10-CM

## 2023-10-06 DIAGNOSIS — Z00.00 ENCOUNTER FOR GENERAL ADULT MEDICAL EXAMINATION WITHOUT ABNORMAL FINDINGS: ICD-10-CM

## 2023-10-06 DIAGNOSIS — M25.562 CHRONIC PAIN OF LEFT KNEE: ICD-10-CM

## 2023-10-06 DIAGNOSIS — R73.01 IFG (IMPAIRED FASTING GLUCOSE): ICD-10-CM

## 2023-10-06 DIAGNOSIS — G89.29 CHRONIC PAIN OF LEFT KNEE: ICD-10-CM

## 2023-10-06 DIAGNOSIS — Z00.00 WELLNESS EXAMINATION: ICD-10-CM

## 2023-10-06 DIAGNOSIS — E55.9 VITAMIN D DEFICIENCY, UNSPECIFIED: ICD-10-CM

## 2023-10-06 DIAGNOSIS — K64.4 EXTERNAL HEMORRHOID: ICD-10-CM

## 2023-10-06 DIAGNOSIS — Z00.00 WELLNESS EXAMINATION: Primary | ICD-10-CM

## 2023-10-06 DIAGNOSIS — Z23 NEEDS FLU SHOT: ICD-10-CM

## 2023-10-06 DIAGNOSIS — Z12.31 ENCOUNTER FOR SCREENING MAMMOGRAM FOR MALIGNANT NEOPLASM OF BREAST: ICD-10-CM

## 2023-10-06 LAB
25(OH)D3 SERPL-MCNC: 17.6 NG/ML (ref 30–100)
ALBUMIN SERPL-MCNC: 4 G/DL (ref 3.5–5)
ALBUMIN/GLOB SERPL: 1 (ref 1.1–2.2)
ALP SERPL-CCNC: 92 U/L (ref 45–117)
ALT SERPL-CCNC: 26 U/L (ref 12–78)
ANION GAP SERPL CALC-SCNC: 4 MMOL/L (ref 5–15)
AST SERPL-CCNC: 14 U/L (ref 15–37)
BASOPHILS # BLD: 0.1 K/UL (ref 0–0.1)
BASOPHILS NFR BLD: 1 % (ref 0–1)
BILIRUB SERPL-MCNC: 0.8 MG/DL (ref 0.2–1)
BUN SERPL-MCNC: 13 MG/DL (ref 6–20)
BUN/CREAT SERPL: 14 (ref 12–20)
CALCIUM SERPL-MCNC: 9.4 MG/DL (ref 8.5–10.1)
CHLORIDE SERPL-SCNC: 103 MMOL/L (ref 97–108)
CHOLEST SERPL-MCNC: 219 MG/DL
CO2 SERPL-SCNC: 29 MMOL/L (ref 21–32)
CREAT SERPL-MCNC: 0.95 MG/DL (ref 0.55–1.02)
DIFFERENTIAL METHOD BLD: NORMAL
EOSINOPHIL # BLD: 0.2 K/UL (ref 0–0.4)
EOSINOPHIL NFR BLD: 3 % (ref 0–7)
ERYTHROCYTE [DISTWIDTH] IN BLOOD BY AUTOMATED COUNT: 13 % (ref 11.5–14.5)
EST. AVERAGE GLUCOSE BLD GHB EST-MCNC: 108 MG/DL
GLOBULIN SER CALC-MCNC: 4 G/DL (ref 2–4)
GLUCOSE SERPL-MCNC: 94 MG/DL (ref 65–100)
HBA1C MFR BLD: 5.4 % (ref 4–5.6)
HCT VFR BLD AUTO: 43 % (ref 35–47)
HDLC SERPL-MCNC: 52 MG/DL
HDLC SERPL: 4.2 (ref 0–5)
HGB BLD-MCNC: 13.5 G/DL (ref 11.5–16)
IMM GRANULOCYTES # BLD AUTO: 0 K/UL (ref 0–0.04)
IMM GRANULOCYTES NFR BLD AUTO: 0 % (ref 0–0.5)
LDLC SERPL CALC-MCNC: 145.6 MG/DL (ref 0–100)
LYMPHOCYTES # BLD: 2.2 K/UL (ref 0.8–3.5)
LYMPHOCYTES NFR BLD: 38 % (ref 12–49)
MCH RBC QN AUTO: 26.4 PG (ref 26–34)
MCHC RBC AUTO-ENTMCNC: 31.4 G/DL (ref 30–36.5)
MCV RBC AUTO: 84 FL (ref 80–99)
MONOCYTES # BLD: 0.3 K/UL (ref 0–1)
MONOCYTES NFR BLD: 5 % (ref 5–13)
NEUTS SEG # BLD: 3.1 K/UL (ref 1.8–8)
NEUTS SEG NFR BLD: 53 % (ref 32–75)
NRBC # BLD: 0 K/UL (ref 0–0.01)
NRBC BLD-RTO: 0 PER 100 WBC
PLATELET # BLD AUTO: 391 K/UL (ref 150–400)
PMV BLD AUTO: 9.6 FL (ref 8.9–12.9)
POTASSIUM SERPL-SCNC: 4.7 MMOL/L (ref 3.5–5.1)
PROT SERPL-MCNC: 8 G/DL (ref 6.4–8.2)
RBC # BLD AUTO: 5.12 M/UL (ref 3.8–5.2)
SODIUM SERPL-SCNC: 136 MMOL/L (ref 136–145)
TRIGL SERPL-MCNC: 107 MG/DL
TSH SERPL DL<=0.05 MIU/L-ACNC: 2.35 UIU/ML (ref 0.36–3.74)
VLDLC SERPL CALC-MCNC: 21.4 MG/DL
WBC # BLD AUTO: 5.9 K/UL (ref 3.6–11)

## 2023-10-06 PROCEDURE — 90471 IMMUNIZATION ADMIN: CPT | Performed by: INTERNAL MEDICINE

## 2023-10-06 PROCEDURE — 99396 PREV VISIT EST AGE 40-64: CPT | Performed by: INTERNAL MEDICINE

## 2023-10-06 PROCEDURE — 90674 CCIIV4 VAC NO PRSV 0.5 ML IM: CPT | Performed by: INTERNAL MEDICINE

## 2023-10-06 RX ORDER — HYDROCORTISONE ACETATE PRAMOXINE HCL 1; 1 G/100G; G/100G
CREAM TOPICAL
Qty: 30 G | Refills: 1 | Status: SHIPPED | OUTPATIENT
Start: 2023-10-06

## 2023-10-06 SDOH — ECONOMIC STABILITY: FOOD INSECURITY: WITHIN THE PAST 12 MONTHS, YOU WORRIED THAT YOUR FOOD WOULD RUN OUT BEFORE YOU GOT MONEY TO BUY MORE.: NEVER TRUE

## 2023-10-06 SDOH — ECONOMIC STABILITY: FOOD INSECURITY: WITHIN THE PAST 12 MONTHS, THE FOOD YOU BOUGHT JUST DIDN'T LAST AND YOU DIDN'T HAVE MONEY TO GET MORE.: NEVER TRUE

## 2023-10-06 SDOH — ECONOMIC STABILITY: INCOME INSECURITY: HOW HARD IS IT FOR YOU TO PAY FOR THE VERY BASICS LIKE FOOD, HOUSING, MEDICAL CARE, AND HEATING?: NOT HARD AT ALL

## 2023-10-06 SDOH — ECONOMIC STABILITY: HOUSING INSECURITY
IN THE LAST 12 MONTHS, WAS THERE A TIME WHEN YOU DID NOT HAVE A STEADY PLACE TO SLEEP OR SLEPT IN A SHELTER (INCLUDING NOW)?: NO

## 2023-10-06 ASSESSMENT — ENCOUNTER SYMPTOMS
SHORTNESS OF BREATH: 0
RECTAL PAIN: 1
DIARRHEA: 0
PHOTOPHOBIA: 0
EYE DISCHARGE: 0
EYE ITCHING: 0
CONSTIPATION: 0
BLOOD IN STOOL: 1
ABDOMINAL PAIN: 0
EYE REDNESS: 0
CHEST TIGHTNESS: 0
BACK PAIN: 0
EYE PAIN: 0

## 2023-10-09 ENCOUNTER — TELEPHONE (OUTPATIENT)
Age: 43
End: 2023-10-09

## 2023-10-09 DIAGNOSIS — R92.8 ABNORMAL MAMMOGRAM: Primary | ICD-10-CM

## 2023-10-09 NOTE — TELEPHONE ENCOUNTER
Patient called requesting a mammogram diagnostic order be send to [facility] f: [phone]

## 2023-10-26 ENCOUNTER — TELEPHONE (OUTPATIENT)
Age: 43
End: 2023-10-26

## 2023-10-26 DIAGNOSIS — R92.8 ABNORMAL MAMMOGRAM: Primary | ICD-10-CM

## 2023-10-26 NOTE — TELEPHONE ENCOUNTER
[facility] was calling to request a new order for L breast limited ultrasound   Patient is scheduled for first thing tomorrow morning so requesting this be faxed today if possible     Fax number: [phone]

## 2023-10-30 ENCOUNTER — TELEPHONE (OUTPATIENT)
Age: 43
End: 2023-10-30

## 2023-10-30 DIAGNOSIS — R92.8 ABNORMAL MAMMOGRAM: Primary | ICD-10-CM

## 2024-01-24 ENCOUNTER — TELEPHONE (OUTPATIENT)
Age: 44
End: 2024-01-24

## 2024-01-24 NOTE — TELEPHONE ENCOUNTER
----- Message from Mickey Subhash sent at 1/24/2024  8:50 AM EST -----  Subject: Referral Request    Reason for referral request? Patient needs basic blood panel ordered as   well as p3 and p4 and thyroid and vitiman d, sugar and cholesterol  Provider patient wants to be referred to(if known):     Provider Phone Number(if known):    Additional Information for Provider?   ---------------------------------------------------------------------------  --------------  CALL BACK INFO    [phone]; OK to leave message on voicemail  ---------------------------------------------------------------------------  --------------

## 2024-02-05 ENCOUNTER — TELEPHONE (OUTPATIENT)
Age: 44
End: 2024-02-05

## 2024-02-05 NOTE — TELEPHONE ENCOUNTER
02/05/2024--I called and spoke with the patient and let her know I will email her vaccine records to her. She verbalized understanding.

## 2024-02-05 NOTE — TELEPHONE ENCOUNTER
Call patient about the flu shot information she needs to be submitted for work.    Call Keri [phone]

## 2024-09-04 ENCOUNTER — TELEPHONE (OUTPATIENT)
Age: 44
End: 2024-09-04

## 2024-09-04 DIAGNOSIS — G89.29 CHRONIC PAIN OF LEFT KNEE: Primary | ICD-10-CM

## 2024-09-04 DIAGNOSIS — M25.562 CHRONIC PAIN OF LEFT KNEE: Primary | ICD-10-CM

## 2024-09-04 NOTE — TELEPHONE ENCOUNTER
Patient is requesting a referral for PT    Physical Therapy at [address] [address]  P: [phone]  F: [phone]    Reason: Left knee pain

## 2024-09-05 NOTE — TELEPHONE ENCOUNTER
Verified [facility] and no insurance referral is required. Please put in order for physical therapy

## 2024-09-18 ENCOUNTER — HOSPITAL ENCOUNTER (OUTPATIENT)
Facility: HOSPITAL | Age: 44
Setting detail: RECURRING SERIES
Discharge: HOME | End: 2024-09-21
Attending: INTERNAL MEDICINE
Payer: COMMERCIAL

## 2024-09-18 PROCEDURE — 97110 THERAPEUTIC EXERCISES: CPT

## 2024-09-18 PROCEDURE — 97162 PT EVAL MOD COMPLEX 30 MIN: CPT

## 2024-09-24 ENCOUNTER — HOSPITAL ENCOUNTER (OUTPATIENT)
Facility: HOSPITAL | Age: 44
Setting detail: RECURRING SERIES
Discharge: HOME | End: 2024-09-27
Attending: INTERNAL MEDICINE
Payer: COMMERCIAL

## 2024-09-24 PROCEDURE — 97110 THERAPEUTIC EXERCISES: CPT

## 2024-09-24 PROCEDURE — 97112 NEUROMUSCULAR REEDUCATION: CPT

## 2024-09-26 ENCOUNTER — APPOINTMENT (OUTPATIENT)
Facility: HOSPITAL | Age: 44
End: 2024-09-26
Attending: INTERNAL MEDICINE
Payer: COMMERCIAL

## 2024-10-01 ENCOUNTER — HOSPITAL ENCOUNTER (OUTPATIENT)
Facility: HOSPITAL | Age: 44
Setting detail: RECURRING SERIES
Discharge: HOME | End: 2024-10-04
Attending: INTERNAL MEDICINE
Payer: COMMERCIAL

## 2024-10-01 PROCEDURE — 97530 THERAPEUTIC ACTIVITIES: CPT

## 2024-10-01 PROCEDURE — 97110 THERAPEUTIC EXERCISES: CPT

## 2024-10-01 NOTE — PROGRESS NOTES
PHYSICAL THERAPY - MEDICARE DAILY TREATMENT NOTE (updated 3/23)      Date: 10/1/2024          Patient Name:  Keri Allen :  1980   Medical   Diagnosis:  Chronic pain of left knee [M25.562, G89.29] Treatment Diagnosis:  M25.562  LEFT KNEE PAIN    Referral Source:  Chang Frances MD Insurance:   Payor: [facility] / Plan: [facility] [employer] / Product Type: *No Product type* /                     Patient  verified yes     Visit #   Current  / Total 3 6   Time   In / Out 8:50 a 9:30 a   Total Treatment Time 40   Total Timed Codes 40   1:1 Treatment Time 40      [facility] Totals Reminder:  bill using total billable   min of TIMED therapeutic procedures and modalities.   8-22 min = 1 unit; 23-37 min = 2 units; 38-52 min = 3 units; 53-67 min = 4 units; 68-82 min = 5 units            SUBJECTIVE    Pain Level (0-10 scale): 0    Any medication changes, allergies to medications, adverse drug reactions, diagnosis change, or new procedure performed?: [x] No    [] Yes (see summary sheet for update)  Medications: Verified on Patient Summary List    Subjective functional status/changes:     Patient reports that she was able to go for a jog for 15-20 minutes.  The pain returned but she was able to resolve it with her stretches.      OBJECTIVE      Therapeutic Procedures:  Tx Min Billable or 1:1 Min (if diff from Tx Min) Procedure, Rationale, Specifics   30  27236 Therapeutic Exercise (timed):  increase ROM, strength, coordination, balance, and proprioception to improve patient's ability to progress to PLOF and address remaining functional goals. (see flow sheet as applicable)     Details if applicable:       56918 Neuromuscular Re-Education (timed):  improve balance, coordination, kinesthetic sense, posture, core stability and proprioception to improve patient's ability to develop conscious control of individual muscles and awareness of position of extremities in order to progress to PLOF and address remaining

## 2024-10-03 ENCOUNTER — APPOINTMENT (OUTPATIENT)
Facility: HOSPITAL | Age: 44
End: 2024-10-03
Attending: INTERNAL MEDICINE
Payer: COMMERCIAL

## 2024-10-08 ENCOUNTER — HOSPITAL ENCOUNTER (OUTPATIENT)
Facility: HOSPITAL | Age: 44
Setting detail: RECURRING SERIES
Discharge: HOME | End: 2024-10-11
Attending: INTERNAL MEDICINE
Payer: COMMERCIAL

## 2024-10-08 PROCEDURE — 97530 THERAPEUTIC ACTIVITIES: CPT

## 2024-10-08 PROCEDURE — 97110 THERAPEUTIC EXERCISES: CPT

## 2024-10-08 NOTE — PROGRESS NOTES
PHYSICAL THERAPY - MEDICARE DAILY TREATMENT NOTE (updated 3/23)      Date: 10/8/2024          Patient Name:  Keri Allen :  1980   Medical   Diagnosis:  Chronic pain of left knee [M25.562, G89.29] Treatment Diagnosis:  M25.562  LEFT KNEE PAIN    Referral Source:  Chang Frances MD Insurance:   Payor: [facility] / Plan: [facility] / Product Type: *No Product type* /                     Patient  verified yes     Visit #   Current  / Total 4 6   Time   In / Out 8:50 a 9:30 a   Total Treatment Time 40   Total Timed Codes 40   1:1 Treatment Time 40      [facility] Totals Reminder:  bill using total billable   min of TIMED therapeutic procedures and modalities.   8-22 min = 1 unit; 23-37 min = 2 units; 38-52 min = 3 units; 53-67 min = 4 units; 68-82 min = 5 units            SUBJECTIVE    Pain Level (0-10 scale): 2-3    Any medication changes, allergies to medications, adverse drug reactions, diagnosis change, or new procedure performed?: [x] No    [] Yes (see summary sheet for update)  Medications: Verified on Patient Summary List    Subjective functional status/changes:     Patient reports that her knee is doing well unless she has to do too much walking or standing.  This weekend she attended an event with lots of dancing which increased her pain.  \"I think without the therapy it would have been much worse.\"     OBJECTIVE      Therapeutic Procedures:  Tx Min Billable or 1:1 Min (if diff from Tx Min) Procedure, Rationale, Specifics   30  69893 Therapeutic Exercise (timed):  increase ROM, strength, coordination, balance, and proprioception to improve patient's ability to progress to PLOF and address remaining functional goals. (see flow sheet as applicable)     Details if applicable:       50548 Neuromuscular Re-Education (timed):  improve balance, coordination, kinesthetic sense, posture, core stability and proprioception to improve patient's ability to develop conscious control of individual

## 2024-10-10 ENCOUNTER — APPOINTMENT (OUTPATIENT)
Facility: HOSPITAL | Age: 44
End: 2024-10-10
Attending: INTERNAL MEDICINE
Payer: COMMERCIAL

## 2024-10-15 ENCOUNTER — HOSPITAL ENCOUNTER (OUTPATIENT)
Facility: HOSPITAL | Age: 44
Setting detail: RECURRING SERIES
Discharge: HOME | End: 2024-10-18
Attending: INTERNAL MEDICINE
Payer: COMMERCIAL

## 2024-10-15 PROCEDURE — 97110 THERAPEUTIC EXERCISES: CPT

## 2024-10-15 PROCEDURE — 97112 NEUROMUSCULAR REEDUCATION: CPT

## 2024-10-15 NOTE — PROGRESS NOTES
Physical Therapy at [place],   a part of [place] [address], [address]  [place] [address]  Phone: [phone]  Fax: [phone]  PHYSICAL THERAPY PROGRESS NOTE  Patient Name:  Keri Allen :  1980   Treatment/Medical Diagnosis: Chronic pain of left knee [M25.562, G89.29]   Referral Source:  Chang Frances MD     Date of Initial Visit:  24 Attended Visits:  5 Missed Visits:  0     SUMMARY OF TREATMENT/ASSESSMENT:  Therapeutic exercise, neuromuscular re-education, HEP instruction, squatting and lifting mechanics instruction     CURRENT STATUS/GOALS  At time of reassessment, the patient has met 3/3 STG and 1/4 LTG.  She demonstrate notable improvement in B knee flexion, single limb stability and BLE strength which has improved functional ability to ambulate community distances.  She remains limited in tolerance for higher level activities such as performance of gym activities, heavier lifting with household chores, and aerobic activity for weight loss.  She will continue to benefit from skilled PT services 1x/week for up to 8 weeks to achieve outstanding goals.      Short Term Goals: To be accomplished in 8 treatments.  1) The patient will be independent with introductory HEP with no v.c. MET  2) The patient will demonstrate knee flexion AROM to 102 deg to allow for donning and doffing of all LE clothing and shoe wear without assistance needed. MET  3) The patient will report ability to ambulate for 30 minutes consistently prior to pain onset to ease grocery shopping. MET     Long Term Goals: To be accomplished in 24 treatments.  1) The patient will demonstrate 4+/5 B LE strength to allow for improved tolerance for community distance ambulation.    2) The patient will report ability to ambulate 1.5 miles without increased pain to return to weight loss walking program.   3) The patient will be able to squat and lift 20# from the floor

## 2024-10-15 NOTE — PROGRESS NOTES
PHYSICAL THERAPY - MEDICARE DAILY TREATMENT NOTE (updated 3/23)      Date: 10/15/2024          Patient Name:  Keri Allen :  1980   Medical   Diagnosis:  Chronic pain of left knee [M25.562, G89.29] Treatment Diagnosis:  M25.562  LEFT KNEE PAIN    Referral Source:  Chang Frances MD Insurance:   Payor: [facility] / Plan: [facility] [employer] / Product Type: *No Product type* /                     Patient  verified yes     Visit #   Current  / Total 5 6   Time   In / Out 8:50 a 9:30 a   Total Treatment Time 40   Total Timed Codes 40   1:1 Treatment Time 40      [facility] Totals Reminder:  bill using total billable   min of TIMED therapeutic procedures and modalities.   8-22 min = 1 unit; 23-37 min = 2 units; 38-52 min = 3 units; 53-67 min = 4 units; 68-82 min = 5 units            SUBJECTIVE    Pain Level (0-10 scale): 0    Any medication changes, allergies to medications, adverse drug reactions, diagnosis change, or new procedure performed?: [x] No    [] Yes (see summary sheet for update)  Medications: Verified on Patient Summary List    Subjective functional status/changes:     Patient reports that she has noticed continued improvements, slow and steady.  She has no pain coming in this morning but notes that she has not been very active yet today.     OBJECTIVE      Therapeutic Procedures:  Tx Min Billable or 1:1 Min (if diff from Tx Min) Procedure, Rationale, Specifics   30  35351 Therapeutic Exercise (timed):  increase ROM, strength, coordination, balance, and proprioception to improve patient's ability to progress to PLOF and address remaining functional goals. (see flow sheet as applicable)     Details if applicable:     10  26670 Neuromuscular Re-Education (timed):  improve balance, coordination, kinesthetic sense, posture, core stability and proprioception to improve patient's ability to develop conscious control of individual muscles and awareness of position of extremities in order to

## 2024-10-17 ENCOUNTER — APPOINTMENT (OUTPATIENT)
Facility: HOSPITAL | Age: 44
End: 2024-10-17
Attending: INTERNAL MEDICINE
Payer: COMMERCIAL

## 2024-10-22 ENCOUNTER — HOSPITAL ENCOUNTER (OUTPATIENT)
Facility: HOSPITAL | Age: 44
Setting detail: RECURRING SERIES
Discharge: HOME | End: 2024-10-25
Attending: INTERNAL MEDICINE
Payer: COMMERCIAL

## 2024-10-22 PROCEDURE — 97110 THERAPEUTIC EXERCISES: CPT

## 2024-10-22 PROCEDURE — 97112 NEUROMUSCULAR REEDUCATION: CPT

## 2024-10-22 NOTE — PROGRESS NOTES
PHYSICAL THERAPY - MEDICARE DAILY TREATMENT NOTE (updated 3/23)      Date: 10/22/2024          Patient Name:  Keri Allen :  1980   Medical   Diagnosis:  Chronic pain of left knee [M25.562, G89.29] Treatment Diagnosis:  M25.562  LEFT KNEE PAIN    Referral Source:  Chang Frances MD Insurance:   Payor: [facility] / Plan: [facility] [employer] / Product Type: *No Product type* /                     Patient  verified yes     Visit #   Current  / Total 6 6   Time   In / Out 8:50 a 9:30 a   Total Treatment Time 40   Total Timed Codes 40   1:1 Treatment Time 40      [facility] Totals Reminder:  bill using total billable   min of TIMED therapeutic procedures and modalities.   8-22 min = 1 unit; 23-37 min = 2 units; 38-52 min = 3 units; 53-67 min = 4 units; 68-82 min = 5 units            SUBJECTIVE    Pain Level (0-10 scale): 4-5    Any medication changes, allergies to medications, adverse drug reactions, diagnosis change, or new procedure performed?: [x] No    [] Yes (see summary sheet for update)  Medications: Verified on Patient Summary List    Subjective functional status/changes:     Patient reports that she had a tripping incident that caused her to catch all of her weight on the L leg and she has had increased pain since then, especially with bending her knee.  She has had to take more Aleve recently but is trying not to take too much.     OBJECTIVE      Therapeutic Procedures:  Tx Min Billable or 1:1 Min (if diff from Tx Min) Procedure, Rationale, Specifics   30  07732 Therapeutic Exercise (timed):  increase ROM, strength, coordination, balance, and proprioception to improve patient's ability to progress to PLOF and address remaining functional goals. (see flow sheet as applicable)     Details if applicable:     10  46069 Neuromuscular Re-Education (timed):  improve balance, coordination, kinesthetic sense, posture, core stability and proprioception to improve patient's ability to develop conscious

## 2024-10-24 ENCOUNTER — APPOINTMENT (OUTPATIENT)
Facility: HOSPITAL | Age: 44
End: 2024-10-24
Attending: INTERNAL MEDICINE
Payer: COMMERCIAL

## 2024-10-29 ENCOUNTER — APPOINTMENT (OUTPATIENT)
Facility: HOSPITAL | Age: 44
End: 2024-10-29
Attending: INTERNAL MEDICINE
Payer: COMMERCIAL

## 2024-10-31 ENCOUNTER — APPOINTMENT (OUTPATIENT)
Facility: HOSPITAL | Age: 44
End: 2024-10-31
Attending: INTERNAL MEDICINE
Payer: COMMERCIAL

## 2024-11-06 NOTE — PROGRESS NOTES
diarrhea.   Endocrine: Negative for polydipsia.   Genitourinary:  Negative for decreased urine volume, difficulty urinating, dysuria, flank pain, genital sores, hematuria, pelvic pain, urgency, vaginal bleeding, vaginal discharge and vaginal pain.   Musculoskeletal:  Positive for arthralgias and gait problem. Negative for back pain, joint swelling, myalgias and neck pain.   Skin:  Negative for rash.   Neurological:  Negative for dizziness and headaches.   Psychiatric/Behavioral:  Negative for agitation and behavioral problems. The patient is not nervous/anxious.          Vitals:    11/07/24 0919   BP: 126/84   Pulse: 72   Resp: 16   Temp: 98.3 °F (36.8 °C)   SpO2: 98%       Physical Exam  Constitutional:       Appearance: Normal appearance.   HENT:      Head: Normocephalic.      Right Ear: Tympanic membrane normal.      Left Ear: Tympanic membrane normal.   Cardiovascular:      Rate and Rhythm: Normal rate and regular rhythm.      Heart sounds: No murmur heard.  Pulmonary:      Effort: Pulmonary effort is normal.      Breath sounds: Normal breath sounds. No wheezing.   Abdominal:      General: There is no distension.      Palpations: Abdomen is soft.   Musculoskeletal:         General: No swelling.   Skin:     General: Skin is warm and dry.   Neurological:      General: No focal deficit present.      Mental Status: She is alert.   Psychiatric:         Mood and Affect: Mood normal.         Behavior: Behavior normal.           Current Outpatient Medications   Medication Sig    Multiple Vitamins-Minerals (ONE DAILY MULTIVITAMIN WOMEN) TABS Take by mouth daily    hydrocortisone ace-pramoxine (ANALPRAM-HC) 1-1 % cream Insert 1 Applicator into rectum two (2) times a day. (Patient not taking: Reported on 11/7/2024)     No current facility-administered medications for this visit.        No past medical history on file.   Past Surgical History:   Procedure Laterality Date    ABDOMINOPLASTY  04/2023    COSMETIC SURGERY

## 2024-11-07 ENCOUNTER — OFFICE VISIT (OUTPATIENT)
Age: 44
End: 2024-11-07
Payer: COMMERCIAL

## 2024-11-07 VITALS
WEIGHT: 225 LBS | RESPIRATION RATE: 16 BRPM | HEIGHT: 66 IN | SYSTOLIC BLOOD PRESSURE: 126 MMHG | HEART RATE: 72 BPM | TEMPERATURE: 98.3 F | OXYGEN SATURATION: 98 % | BODY MASS INDEX: 36.16 KG/M2 | DIASTOLIC BLOOD PRESSURE: 84 MMHG

## 2024-11-07 DIAGNOSIS — E55.9 VITAMIN D DEFICIENCY, UNSPECIFIED: ICD-10-CM

## 2024-11-07 DIAGNOSIS — E78.5 HYPERLIPIDEMIA, UNSPECIFIED HYPERLIPIDEMIA TYPE: ICD-10-CM

## 2024-11-07 DIAGNOSIS — E66.9 OBESITY, UNSPECIFIED CLASS, UNSPECIFIED OBESITY TYPE, UNSPECIFIED WHETHER SERIOUS COMORBIDITY PRESENT: ICD-10-CM

## 2024-11-07 DIAGNOSIS — Z00.00 ENCOUNTER FOR GENERAL ADULT MEDICAL EXAMINATION WITHOUT ABNORMAL FINDINGS: Primary | ICD-10-CM

## 2024-11-07 DIAGNOSIS — E03.9 HYPOTHYROIDISM, UNSPECIFIED TYPE: ICD-10-CM

## 2024-11-07 PROCEDURE — 99396 PREV VISIT EST AGE 40-64: CPT | Performed by: INTERNAL MEDICINE

## 2024-11-07 SDOH — ECONOMIC STABILITY: FOOD INSECURITY: WITHIN THE PAST 12 MONTHS, THE FOOD YOU BOUGHT JUST DIDN'T LAST AND YOU DIDN'T HAVE MONEY TO GET MORE.: NEVER TRUE

## 2024-11-07 SDOH — ECONOMIC STABILITY: FOOD INSECURITY: WITHIN THE PAST 12 MONTHS, YOU WORRIED THAT YOUR FOOD WOULD RUN OUT BEFORE YOU GOT MONEY TO BUY MORE.: NEVER TRUE

## 2024-11-07 SDOH — ECONOMIC STABILITY: INCOME INSECURITY: HOW HARD IS IT FOR YOU TO PAY FOR THE VERY BASICS LIKE FOOD, HOUSING, MEDICAL CARE, AND HEATING?: NOT HARD AT ALL

## 2024-11-07 ASSESSMENT — ENCOUNTER SYMPTOMS
SHORTNESS OF BREATH: 0
DIARRHEA: 0
CONSTIPATION: 0
ANAL BLEEDING: 0
BACK PAIN: 0
CHEST TIGHTNESS: 0
ABDOMINAL PAIN: 0
BLOOD IN STOOL: 0

## 2024-11-07 ASSESSMENT — PATIENT HEALTH QUESTIONNAIRE - PHQ9
SUM OF ALL RESPONSES TO PHQ QUESTIONS 1-9: 0
2. FEELING DOWN, DEPRESSED OR HOPELESS: NOT AT ALL
SUM OF ALL RESPONSES TO PHQ QUESTIONS 1-9: 0
SUM OF ALL RESPONSES TO PHQ QUESTIONS 1-9: 0
1. LITTLE INTEREST OR PLEASURE IN DOING THINGS: NOT AT ALL
SUM OF ALL RESPONSES TO PHQ QUESTIONS 1-9: 0
SUM OF ALL RESPONSES TO PHQ9 QUESTIONS 1 & 2: 0

## 2024-11-08 LAB
25(OH)D3+25(OH)D2 SERPL-MCNC: 17.9 NG/ML (ref 30–100)
ALBUMIN SERPL-MCNC: 4.1 G/DL (ref 3.9–4.9)
ALP SERPL-CCNC: 96 IU/L (ref 44–121)
ALT SERPL-CCNC: 20 IU/L (ref 0–32)
AST SERPL-CCNC: 18 IU/L (ref 0–40)
BASOPHILS # BLD AUTO: 0.1 X10E3/UL (ref 0–0.2)
BASOPHILS NFR BLD AUTO: 1 %
BILIRUB SERPL-MCNC: 0.5 MG/DL (ref 0–1.2)
BUN SERPL-MCNC: 9 MG/DL (ref 6–24)
BUN/CREAT SERPL: 10 (ref 9–23)
CALCIUM SERPL-MCNC: 9.3 MG/DL (ref 8.7–10.2)
CHLORIDE SERPL-SCNC: 100 MMOL/L (ref 96–106)
CHOLEST SERPL-MCNC: 204 MG/DL (ref 100–199)
CO2 SERPL-SCNC: 21 MMOL/L (ref 20–29)
CREAT SERPL-MCNC: 0.86 MG/DL (ref 0.57–1)
EGFRCR SERPLBLD CKD-EPI 2021: 86 ML/MIN/1.73
EOSINOPHIL # BLD AUTO: 0.1 X10E3/UL (ref 0–0.4)
EOSINOPHIL NFR BLD AUTO: 2 %
ERYTHROCYTE [DISTWIDTH] IN BLOOD BY AUTOMATED COUNT: 12.4 % (ref 11.7–15.4)
GLOBULIN SER CALC-MCNC: 3 G/DL (ref 1.5–4.5)
GLUCOSE SERPL-MCNC: 87 MG/DL (ref 70–99)
HBA1C MFR BLD: 5.7 % (ref 4.8–5.6)
HCT VFR BLD AUTO: 39.8 % (ref 34–46.6)
HDLC SERPL-MCNC: 45 MG/DL
HGB BLD-MCNC: 12.5 G/DL (ref 11.1–15.9)
IMM GRANULOCYTES # BLD AUTO: 0 X10E3/UL (ref 0–0.1)
IMM GRANULOCYTES NFR BLD AUTO: 0 %
IMP & REVIEW OF LAB RESULTS: NORMAL
LDLC SERPL CALC-MCNC: 140 MG/DL (ref 0–99)
LYMPHOCYTES # BLD AUTO: 2.4 X10E3/UL (ref 0.7–3.1)
LYMPHOCYTES NFR BLD AUTO: 40 %
MCH RBC QN AUTO: 27 PG (ref 26.6–33)
MCHC RBC AUTO-ENTMCNC: 31.4 G/DL (ref 31.5–35.7)
MCV RBC AUTO: 86 FL (ref 79–97)
MONOCYTES # BLD AUTO: 0.4 X10E3/UL (ref 0.1–0.9)
MONOCYTES NFR BLD AUTO: 6 %
NEUTROPHILS # BLD AUTO: 3.1 X10E3/UL (ref 1.4–7)
NEUTROPHILS NFR BLD AUTO: 51 %
PLATELET # BLD AUTO: 425 X10E3/UL (ref 150–450)
POTASSIUM SERPL-SCNC: 4.3 MMOL/L (ref 3.5–5.2)
PROT SERPL-MCNC: 7.1 G/DL (ref 6–8.5)
RBC # BLD AUTO: 4.63 X10E6/UL (ref 3.77–5.28)
SODIUM SERPL-SCNC: 136 MMOL/L (ref 134–144)
TRIGL SERPL-MCNC: 107 MG/DL (ref 0–149)
TSH SERPL DL<=0.005 MIU/L-ACNC: 3.66 UIU/ML (ref 0.45–4.5)
VLDLC SERPL CALC-MCNC: 19 MG/DL (ref 5–40)
WBC # BLD AUTO: 6.1 X10E3/UL (ref 3.4–10.8)

## 2024-11-08 RX ORDER — LEVOTHYROXINE SODIUM 50 UG/1
50 TABLET ORAL DAILY
Qty: 90 TABLET | Refills: 1 | Status: SHIPPED | OUTPATIENT
Start: 2024-11-08

## 2024-11-08 RX ORDER — ERGOCALCIFEROL 1.25 MG/1
50000 CAPSULE, LIQUID FILLED ORAL WEEKLY
Qty: 12 CAPSULE | Refills: 1 | Status: SHIPPED | OUTPATIENT
Start: 2024-11-08

## 2024-11-11 ENCOUNTER — TELEPHONE (OUTPATIENT)
Age: 44
End: 2024-11-11

## 2024-11-11 NOTE — TELEPHONE ENCOUNTER
11/11/2024--This user called and spoke with the patient and read her the [employer] message from  about her labs and resuming the Thyroid med and Vitamin d and patient is agreeable to that and is aware that It was sent in to [facility]. Patient aware of other lab results as well and made a 3 month follow up with  to get those labs repeated. All question's were answered at that time.

## 2024-11-11 NOTE — TELEPHONE ENCOUNTER
----- Message from Dr. Chang Frances MD sent at 11/10/2024  8:14 PM EST -----  My chart not read, please call with [employer] message and make sure that she is ok resuming levothyroxine.

## 2024-11-12 ENCOUNTER — APPOINTMENT (OUTPATIENT)
Facility: HOSPITAL | Age: 44
End: 2024-11-12
Attending: INTERNAL MEDICINE
Payer: COMMERCIAL

## 2024-11-19 ENCOUNTER — HOSPITAL ENCOUNTER (OUTPATIENT)
Facility: HOSPITAL | Age: 44
Setting detail: RECURRING SERIES
Discharge: HOME | End: 2024-11-22
Attending: INTERNAL MEDICINE
Payer: COMMERCIAL

## 2024-11-19 PROCEDURE — 97112 NEUROMUSCULAR REEDUCATION: CPT

## 2024-11-19 PROCEDURE — 97110 THERAPEUTIC EXERCISES: CPT

## 2024-11-19 NOTE — PROGRESS NOTES
PHYSICAL THERAPY - MEDICARE DAILY TREATMENT NOTE (updated 3/23)      Date: 2024          Patient Name:  Keri Allen :  1980   Medical   Diagnosis:  Chronic pain of left knee [M25.562, G89.29] Treatment Diagnosis:  M25.562  LEFT KNEE PAIN    Referral Source:  Chang Frances MD Insurance:   Payor: [facility] / Plan: [facility] [employer] / Product Type: *No Product type* /                     Patient  verified yes     Visit #   Current  / Total 7 8   Time   In / Out 8:45 a 9:40 a   Total Treatment Time 55   Total Timed Codes 55   1:1 Treatment Time 5      [facility] Totals Reminder:  bill using total billable   min of TIMED therapeutic procedures and modalities.   8-22 min = 1 unit; 23-37 min = 2 units; 38-52 min = 3 units; 53-67 min = 4 units; 68-82 min = 5 units            SUBJECTIVE    Pain Level (0-10 scale): 0    Any medication changes, allergies to medications, adverse drug reactions, diagnosis change, or new procedure performed?: [x] No    [] Yes (see summary sheet for update)  Medications: Verified on Patient Summary List    Subjective functional status/changes:     Patient reports that she is seeing the progress.  She has been able to keep up with her exercises and they are helping.  \"It's not gone but it's much better.\"  She has been able to walk/jog on the treadmill for an hour but does have pain following thi activity.  Due to insurance visit limits she is agreeable to discharge today.      OBJECTIVE      Therapeutic Procedures:  Tx Min Billable or 1:1 Min (if diff from Tx Min) Procedure, Rationale, Specifics   45  17246 Therapeutic Exercise (timed):  increase ROM, strength, coordination, balance, and proprioception to improve patient's ability to progress to PLOF and address remaining functional goals. (see flow sheet as applicable)     Details if applicable:     10  49488 Neuromuscular Re-Education (timed):  improve balance, coordination, kinesthetic sense, posture, core stability

## 2024-11-19 NOTE — THERAPY DISCHARGE
Physical Therapy at [place],   a part of [place] [address], [address]  [place] [address]  Phone: [phone]  Fax: [phone]  DISCHARGE SUMMARY  Patient Name: Keri Allen : 1980   Treatment/Medical Diagnosis: Chronic pain of left knee [M25.562, G89.29]   Referral Source: Chang Frances MD     Date of Initial Visit: 24 Attended Visits: 7 Missed Visits: 0     SUMMARY OF TREATMENT  Therapeutic exercise, neuromuscular re-education, HEP instruction    CURRENT STATUS  At time of discharge, patient has met 3/3 short term goals and 3/4 long term goals.  The pt has improved their FOTO outcome measure for knee function to 63%, initially measured at 50%, indicating a significant improvement in function.  They demonstrated improved knee flexion and extension AROM, single limb stability, LE strength and report functional improvement with community distance ambulation, maneuvering stairs and performing gym routine.  They are independent with HEP performance and demonstrate excellent potential to achieve outstanding goals independently.  They have maximized therapeutic benefit at this time.       Short Term Goals: To be accomplished in 8 treatments.  1) The patient will be independent with introductory HEP with no v.c. MET  2) The patient will demonstrate knee flexion AROM to 102 deg to allow for donning and doffing of all LE clothing and shoe wear without assistance needed. MET  3) The patient will report ability to ambulate for 30 minutes consistently prior to pain onset to ease grocery shopping. MET     Long Term Goals: To be accomplished in 24 treatments.  1) The patient will demonstrate 4+/5 B LE strength to allow for improved tolerance for community distance ambulation.  Progressing toward   2) The patient will report ability to ambulate 1.5 miles without increased pain to return to weight loss walking program.  MET  3) The patient will be

## 2024-11-26 ENCOUNTER — APPOINTMENT (OUTPATIENT)
Facility: HOSPITAL | Age: 44
End: 2024-11-26
Attending: INTERNAL MEDICINE
Payer: COMMERCIAL

## 2025-01-29 DIAGNOSIS — E03.9 ACQUIRED HYPOTHYROIDISM: ICD-10-CM

## 2025-01-29 DIAGNOSIS — E55.9 VITAMIN D DEFICIENCY, UNSPECIFIED: ICD-10-CM

## 2025-01-29 DIAGNOSIS — R73.01 IFG (IMPAIRED FASTING GLUCOSE): ICD-10-CM

## 2025-01-29 DIAGNOSIS — E03.9 ACQUIRED HYPOTHYROIDISM: Primary | ICD-10-CM

## 2025-01-31 DIAGNOSIS — E03.9 ACQUIRED HYPOTHYROIDISM: ICD-10-CM

## 2025-01-31 DIAGNOSIS — R73.01 IFG (IMPAIRED FASTING GLUCOSE): ICD-10-CM

## 2025-01-31 DIAGNOSIS — E55.9 VITAMIN D DEFICIENCY, UNSPECIFIED: ICD-10-CM

## 2025-02-01 LAB
25(OH)D3+25(OH)D2 SERPL-MCNC: 42.4 NG/ML (ref 30–100)
ALBUMIN SERPL-MCNC: 4 G/DL (ref 3.9–4.9)
ALP SERPL-CCNC: 94 IU/L (ref 44–121)
ALT SERPL-CCNC: 17 IU/L (ref 0–32)
AST SERPL-CCNC: 17 IU/L (ref 0–40)
BILIRUB DIRECT SERPL-MCNC: 0.16 MG/DL (ref 0–0.4)
BILIRUB SERPL-MCNC: 0.5 MG/DL (ref 0–1.2)
PROT SERPL-MCNC: 7.1 G/DL (ref 6–8.5)
T4 FREE SERPL-MCNC: 1.29 NG/DL (ref 0.82–1.77)
TSH SERPL DL<=0.005 MIU/L-ACNC: 2.59 UIU/ML (ref 0.45–4.5)

## 2025-02-07 ENCOUNTER — OFFICE VISIT (OUTPATIENT)
Facility: CLINIC | Age: 45
End: 2025-02-07

## 2025-02-07 VITALS
SYSTOLIC BLOOD PRESSURE: 115 MMHG | TEMPERATURE: 97.9 F | BODY MASS INDEX: 36.8 KG/M2 | HEART RATE: 82 BPM | WEIGHT: 229 LBS | HEIGHT: 66 IN | OXYGEN SATURATION: 98 % | DIASTOLIC BLOOD PRESSURE: 80 MMHG | RESPIRATION RATE: 16 BRPM

## 2025-02-07 DIAGNOSIS — E66.9 OBESITY, UNSPECIFIED CLASS, UNSPECIFIED OBESITY TYPE, UNSPECIFIED WHETHER SERIOUS COMORBIDITY PRESENT: ICD-10-CM

## 2025-02-07 DIAGNOSIS — E78.5 HYPERLIPIDEMIA, UNSPECIFIED HYPERLIPIDEMIA TYPE: ICD-10-CM

## 2025-02-07 DIAGNOSIS — E55.9 VITAMIN D DEFICIENCY, UNSPECIFIED: ICD-10-CM

## 2025-02-07 DIAGNOSIS — E03.9 HYPOTHYROIDISM, UNSPECIFIED TYPE: ICD-10-CM

## 2025-02-07 DIAGNOSIS — R73.01 IFG (IMPAIRED FASTING GLUCOSE): Primary | ICD-10-CM

## 2025-02-07 LAB — HBA1C MFR BLD: 5.6 %

## 2025-02-07 RX ORDER — ERGOCALCIFEROL 1.25 MG/1
50000 CAPSULE, LIQUID FILLED ORAL WEEKLY
Qty: 12 CAPSULE | Refills: 1 | Status: SHIPPED | OUTPATIENT
Start: 2025-02-07

## 2025-02-07 RX ORDER — LEVOTHYROXINE SODIUM 75 UG/1
75 TABLET ORAL DAILY
Qty: 90 TABLET | Refills: 1 | Status: SHIPPED | OUTPATIENT
Start: 2025-02-07

## 2025-02-07 SDOH — ECONOMIC STABILITY: FOOD INSECURITY: WITHIN THE PAST 12 MONTHS, THE FOOD YOU BOUGHT JUST DIDN'T LAST AND YOU DIDN'T HAVE MONEY TO GET MORE.: NEVER TRUE

## 2025-02-07 SDOH — ECONOMIC STABILITY: FOOD INSECURITY: WITHIN THE PAST 12 MONTHS, YOU WORRIED THAT YOUR FOOD WOULD RUN OUT BEFORE YOU GOT MONEY TO BUY MORE.: NEVER TRUE

## 2025-02-07 ASSESSMENT — ENCOUNTER SYMPTOMS
BACK PAIN: 0
CHEST TIGHTNESS: 0
SHORTNESS OF BREATH: 0
ABDOMINAL PAIN: 0
DIARRHEA: 0
CONSTIPATION: 0

## 2025-02-07 ASSESSMENT — PATIENT HEALTH QUESTIONNAIRE - PHQ9
SUM OF ALL RESPONSES TO PHQ QUESTIONS 1-9: 0
SUM OF ALL RESPONSES TO PHQ QUESTIONS 1-9: 0
1. LITTLE INTEREST OR PLEASURE IN DOING THINGS: NOT AT ALL
SUM OF ALL RESPONSES TO PHQ QUESTIONS 1-9: 0
SUM OF ALL RESPONSES TO PHQ QUESTIONS 1-9: 0
SUM OF ALL RESPONSES TO PHQ9 QUESTIONS 1 & 2: 0
2. FEELING DOWN, DEPRESSED OR HOPELESS: NOT AT ALL

## 2025-02-07 NOTE — PROGRESS NOTES
Keri Allen is a 45 y.o. female who presents today for 3 Month Follow-Up and Weight Management (Pt would like to discuss weight management )  .      She has a history of   Patient Active Problem List   Diagnosis    Obesity    Acquired hypothyroidism   .    Today patient is here for follow up.     History of Present Illness  The patient is a 45-year-old female who presents for a routine follow-up on her thyroid.    Thyroid replacement therapy was resumed at the last visit. She reports a significant increase in weight, which is causing her considerable distress. At the last visit, she expressed frustration with her weight. Since then, her weight has continued to increase and is now at 229 pounds. She has not explored other weight loss programs such as Medi-Weightloss but is considering hormone therapy under the guidance of her gynecologist. Despite normal estrogen levels, she is uncertain about the potential benefits of this approach. She has been making efforts to maintain a healthy lifestyle through regular exercise and a balanced diet, but her motivation has been waning. She expresses no desire to lose weight rapidly, aiming for a 7-pound reduction by the end of March 2025. She is reluctant to use Ozempic or Wegovy due to concerns about long-term drug dependence/side effects. She is prediabetic and has elevated cholesterol levels. Her sleep pattern is normal.    She was experiencing significant myalgias and arthritic issues that were limiting her activities. She did see physical therapy, which ended in November 2024. She continues to perform the exercises recommended by her physical therapist, which she finds beneficial.    Supplemental Information  She reports an increase in work-related stress, which she anticipates will persist until 04/15/2025.    MEDICATIONS  Current: levothyroxine, vitamin D      ROS  Review of Systems   Constitutional:  Negative for fatigue and fever.   HENT:  Negative for congestion

## 2025-05-13 DIAGNOSIS — E03.9 HYPOTHYROIDISM, UNSPECIFIED TYPE: ICD-10-CM

## 2025-05-13 DIAGNOSIS — E78.5 HYPERLIPIDEMIA, UNSPECIFIED HYPERLIPIDEMIA TYPE: ICD-10-CM

## 2025-05-14 ENCOUNTER — RESULTS FOLLOW-UP (OUTPATIENT)
Facility: CLINIC | Age: 45
End: 2025-05-14

## 2025-05-14 LAB
CHOLEST SERPL-MCNC: 209 MG/DL (ref 100–199)
HDLC SERPL-MCNC: 48 MG/DL
IMP & REVIEW OF LAB RESULTS: NORMAL
LDLC SERPL CALC-MCNC: 137 MG/DL (ref 0–99)
TRIGL SERPL-MCNC: 133 MG/DL (ref 0–149)
TSH SERPL DL<=0.005 MIU/L-ACNC: 1.65 UIU/ML (ref 0.45–4.5)
VLDLC SERPL CALC-MCNC: 24 MG/DL (ref 5–40)

## 2025-05-19 DIAGNOSIS — E03.9 HYPOTHYROIDISM, UNSPECIFIED TYPE: ICD-10-CM

## 2025-05-19 RX ORDER — LEVOTHYROXINE SODIUM 75 UG/1
75 TABLET ORAL DAILY
Qty: 90 TABLET | Refills: 1 | Status: SHIPPED | OUTPATIENT
Start: 2025-05-19

## 2025-05-29 ENCOUNTER — OFFICE VISIT (OUTPATIENT)
Facility: CLINIC | Age: 45
End: 2025-05-29
Payer: COMMERCIAL

## 2025-05-29 VITALS
HEIGHT: 66 IN | BODY MASS INDEX: 38.25 KG/M2 | WEIGHT: 238 LBS | HEART RATE: 89 BPM | DIASTOLIC BLOOD PRESSURE: 74 MMHG | OXYGEN SATURATION: 96 % | SYSTOLIC BLOOD PRESSURE: 107 MMHG | TEMPERATURE: 98.5 F | RESPIRATION RATE: 16 BRPM

## 2025-05-29 DIAGNOSIS — E78.5 HYPERLIPIDEMIA, UNSPECIFIED HYPERLIPIDEMIA TYPE: ICD-10-CM

## 2025-05-29 DIAGNOSIS — R73.01 IFG (IMPAIRED FASTING GLUCOSE): ICD-10-CM

## 2025-05-29 DIAGNOSIS — E66.9 OBESITY, UNSPECIFIED CLASS, UNSPECIFIED OBESITY TYPE, UNSPECIFIED WHETHER SERIOUS COMORBIDITY PRESENT: Primary | ICD-10-CM

## 2025-05-29 DIAGNOSIS — E03.9 HYPOTHYROIDISM, UNSPECIFIED TYPE: ICD-10-CM

## 2025-05-29 PROCEDURE — 99214 OFFICE O/P EST MOD 30 MIN: CPT | Performed by: INTERNAL MEDICINE

## 2025-05-29 RX ORDER — PHENTERMINE HYDROCHLORIDE 37.5 MG/1
37.5 CAPSULE ORAL EVERY MORNING
Qty: 30 CAPSULE | Refills: 0 | Status: SHIPPED | OUTPATIENT
Start: 2025-05-29 | End: 2025-06-28

## 2025-05-29 ASSESSMENT — ENCOUNTER SYMPTOMS
BACK PAIN: 0
DIARRHEA: 0
CONSTIPATION: 0
SHORTNESS OF BREATH: 0
CHEST TIGHTNESS: 0
ABDOMINAL PAIN: 0

## 2025-05-29 NOTE — PROGRESS NOTES
agitation. The patient is not nervous/anxious.          Vitals:    05/29/25 1258   BP: 107/74   Pulse: 89   Resp: 16   Temp: 98.5 °F (36.9 °C)   SpO2: 96%       Physical Exam  Constitutional:       Appearance: Normal appearance.   HENT:      Head: Normocephalic.      Right Ear: Tympanic membrane normal.      Left Ear: Tympanic membrane normal.   Cardiovascular:      Rate and Rhythm: Normal rate and regular rhythm.      Heart sounds: No murmur heard.  Pulmonary:      Effort: Pulmonary effort is normal.      Breath sounds: Normal breath sounds. No wheezing.   Abdominal:      General: There is no distension.      Palpations: Abdomen is soft.   Musculoskeletal:         General: No swelling.   Skin:     General: Skin is warm and dry.   Neurological:      General: No focal deficit present.      Mental Status: She is alert.   Psychiatric:         Mood and Affect: Mood normal.         Behavior: Behavior normal.           Current Outpatient Medications   Medication Sig    phentermine 37.5 MG capsule Take 1 capsule by mouth every morning for 30 days. Max Daily Amount: 37.5 mg    levothyroxine (SYNTHROID) 75 MCG tablet Take 1 tablet by mouth daily    vitamin D (ERGOCALCIFEROL) 1.25 MG (06536 UT) CAPS capsule Take 1 capsule by mouth once a week    Multiple Vitamins-Minerals (ONE DAILY MULTIVITAMIN WOMEN) TABS Take by mouth daily     No current facility-administered medications for this visit.        History reviewed. No pertinent past medical history.   Past Surgical History:   Procedure Laterality Date    ABDOMINOPLASTY  04/2023    COSMETIC SURGERY  04/2023    Abdominoplasty      Social History     Tobacco Use    Smoking status: Never    Smokeless tobacco: Never   Substance Use Topics    Alcohol use: Yes     Alcohol/week: 1.0 standard drink of alcohol     Comment: 2-3 per year      Family History   Problem Relation Age of Onset    Hypertension Mother     Lung Disease Mother         TB in '01    Elevated Lipids Father

## 2025-06-06 DIAGNOSIS — E66.9 OBESITY, UNSPECIFIED CLASS, UNSPECIFIED OBESITY TYPE, UNSPECIFIED WHETHER SERIOUS COMORBIDITY PRESENT: ICD-10-CM

## 2025-06-06 RX ORDER — PHENTERMINE HYDROCHLORIDE 37.5 MG/1
37.5 CAPSULE ORAL EVERY MORNING
Qty: 30 CAPSULE | Refills: 0 | Status: SHIPPED | OUTPATIENT
Start: 2025-06-06 | End: 2025-07-06

## 2025-07-11 DIAGNOSIS — E66.9 OBESITY, UNSPECIFIED CLASS, UNSPECIFIED OBESITY TYPE, UNSPECIFIED WHETHER SERIOUS COMORBIDITY PRESENT: ICD-10-CM

## 2025-07-11 RX ORDER — PHENTERMINE HYDROCHLORIDE 37.5 MG/1
37.5 CAPSULE ORAL EVERY MORNING
Qty: 30 CAPSULE | Refills: 1 | Status: SHIPPED | OUTPATIENT
Start: 2025-07-11 | End: 2025-08-10